# Patient Record
Sex: MALE | Race: WHITE | Employment: UNEMPLOYED | URBAN - NONMETROPOLITAN AREA
[De-identification: names, ages, dates, MRNs, and addresses within clinical notes are randomized per-mention and may not be internally consistent; named-entity substitution may affect disease eponyms.]

---

## 2017-03-20 ENCOUNTER — HISTORY (OUTPATIENT)
Dept: FAMILY MEDICINE | Facility: OTHER | Age: 22
End: 2017-03-20

## 2017-03-20 ENCOUNTER — OFFICE VISIT - GICH (OUTPATIENT)
Dept: FAMILY MEDICINE | Facility: OTHER | Age: 22
End: 2017-03-20

## 2017-03-20 DIAGNOSIS — J02.9 ACUTE PHARYNGITIS: ICD-10-CM

## 2017-03-20 LAB — STREP A ANTIGEN - HISTORICAL: NEGATIVE

## 2017-03-21 ENCOUNTER — OFFICE VISIT - GICH (OUTPATIENT)
Dept: FAMILY MEDICINE | Facility: OTHER | Age: 22
End: 2017-03-21

## 2017-03-21 ENCOUNTER — HISTORY (OUTPATIENT)
Dept: FAMILY MEDICINE | Facility: OTHER | Age: 22
End: 2017-03-21

## 2017-03-21 DIAGNOSIS — J11.1 INFLUENZA DUE TO UNIDENTIFIED INFLUENZA VIRUS WITH OTHER RESPIRATORY MANIFESTATIONS: ICD-10-CM

## 2017-03-23 ENCOUNTER — COMMUNICATION - GICH (OUTPATIENT)
Dept: FAMILY MEDICINE | Facility: OTHER | Age: 22
End: 2017-03-23

## 2017-03-23 DIAGNOSIS — L30.9 DERMATITIS: ICD-10-CM

## 2018-01-03 NOTE — NURSING NOTE
Patient Information     Patient Name MRN Kevin Bueno [id number] Male 1995      Nursing Note by Sharron Dominguez at 3/20/2017  2:45 PM     Author:  Sharron Dominguez Service:  (none) Author Type:  NURS- Student Practical Nurse     Filed:  3/20/2017  2:28 PM Encounter Date:  3/20/2017 Status:  Signed     :  Sharron Dominguez (NURS- Student Practical Nurse)            Patient presents with sore, scratchy throat,  Nausea and headache starting today. Sharron Dominguez LPN .............3/20/2017  2:20 PM

## 2018-01-04 NOTE — TELEPHONE ENCOUNTER
Patient Information     Patient Name MRN Kevin Bueno [id number] Male 1995      Telephone Encounter by Wilma Charlton RN at 3/23/2017  4:20 PM     Author:  Wilma Charlton RN Service:  (none) Author Type:  (none)     Filed:  3/23/2017  4:22 PM Encounter Date:  3/23/2017 Status:  Signed     :  Wilma Charlton RN (NURS- Registered Nurse)            Steroid Topical Creams    Office visit in the past 12 months.    Last visit with BA CARRILLO was on: 2016 in [facility]  Next visit with BA CARRILLO is on: No future appointment listed with this provider  Next visit with Family Practice is on: No future appointment listed in this department    Max refill for one year only for documented problems such as psoriasis and eczema.    Patient is due for medication management appointment. Limited refill provided at this time and letter sent for reminder to patient. Prescription refilled per RN Medication Refill Policy.................... Wilma Charlton ....................  3/23/2017   4:20 PM

## 2018-01-04 NOTE — PATIENT INSTRUCTIONS
Patient Information     Patient Name MRN Kevin Bueno [id number] Male 1995      Patient Instructions by Namrata Mercedes NP at 3/20/2017  2:45 PM     Author:  Namrata Mercedes NP Service:  (none) Author Type:  PHYS- Nurse Practitioner     Filed:  3/20/2017  2:49 PM Encounter Date:  3/20/2017 Status:  Signed     :  Namrata Mercedes NP (PHYS- Nurse Practitioner)            Negative rapid strep test    Symptoms likely due to virus. No antibiotic is needed at this time.       Most coughs are caused by a viral infection.   Usually coughs can last 2 to 3 weeks. Sometimes the cough becomes loose (wet) for a few days, and your child coughs up a lot of phlegm (mucus). This is usually a sign that the end of the illness is near.    Most sore throats are caused by viruses and are part of a cold. About 10% of sore throats are caused by strep bacteria.    Encouraged fluids and rest.    May use symptomatic care with tylenol or ibuprofen.     Using a humidifier works well to break up the congestion.     Elevate the mattress to 15 degrees in order to help with the congestion.    Frequent swallows of cool liquid.      Oatmeal or honey coats the throat and some patients find it soothes the pain.     Salt water gargles as needed    Return to clinic with change/worsening of symptoms or concerns.

## 2018-01-04 NOTE — PROGRESS NOTES
Patient Information     Patient Name MRN Sex Kevin Robles [id number] Male 1995      Progress Notes by Tonia Wallace NP at 3/21/2017  4:00 PM     Author:  Tonia Wallace NP Service:  (none) Author Type:  PHYS- Nurse Practitioner     Filed:  3/21/2017  6:32 PM Encounter Date:  3/21/2017 Status:  Signed     :  Tonia Wallace NP (PHYS- Nurse Practitioner)            HPI:    Kevin Koehler is a 22 y.o. male who presents to clinic today for fever. He was seen in  yesterday for sore throat, fever, cough. Has had sinus congestion. Tested for strep which was negative. Dx with viral illness. His sx yeterday. Reports having fevers again today up to 102. This was checked by [facility] nurse. He is concerned about the fever. Does smoke daily. No [facility] records. He reports no flu shot this year. He has taken ibuprofen for sx.     No past medical history on file.  No past surgical history on file.  Social History       Substance Use Topics         Smoking status:   Current Every Day Smoker     Packs/day:  1.00     Types:  Cigarettes     Smokeless tobacco:   Never Used     Alcohol use   No      Comment: occassional      Current Outpatient Prescriptions       Medication  Sig Dispense Refill     miconazole nitrate powder (LOTRIMIN AF) 2 % powder Apply  topically to affected area(s) 2 times daily. 1 Container 2     omeprazole (PRILOSEC) 20 mg Delayed-Release capsule Take 1 capsule by mouth once daily before a meal. 30 capsule 0     QUEtiapine (SEROQUEL) 100 mg tablet   1     triamcinolone (ARISTOCORT; KENALOG) 0.1 % cream Apply  topically to affected area(s) 3 times daily. 1 Tube 0     No current facility-administered medications for this visit.      Medications have been reviewed by me and are current to the best of my knowledge and ability.    Allergies     Allergen  Reactions     Abilify [Aripiprazole] Muscle Weakness     Codeine *Unknown - Childhood Rxn     Penicillins *Unknown - Childhood Rxn        ROS:  Pertinent positives and negatives are noted in HPI.    EXAM:  General appearance: ill but nontoxic appearing male, in no acute distress  Head: normocephalic, atraumatic  Ears: TM's with cone of light, no erythema, canals clear bilaterally  Eyes: conjunctivae normal  Orophayrnx: moist mucous membranes, tonsils with erythema, no exudates or petechiae, no post nasal drip seen  Neck: supple without adenopathy  Respiratory: clear to auscultation bilaterally, cough, no respiratory distress  Cardiac: RRR with no murmurs  Psychological: normal affect, alert and pleasant    ASSESSMENT/PLAN:    ICD-10-CM    1. Influenza J11.1 oseltamivir (TAMIFLU) 75 mg capsule   RST negative yesterday. Sx consistent with influenza. Tx with tamiflu due to onset of sx yesterday. Reviewed need to complete all antivirals. Discussed typical course of illness, symptomatic treatment and when to return to clinic. Patient in agreement with plan and all questions were answered.     Patient Instructions      Index Lithuanian All languages Related topics   Flu (Influenza)   ________________________________________________________________________  KEY POINTS    Flu is caused by a virus, and can be spread by coughing or sneezing, or by touching something with the virus on it.    The flu vaccine is the best way to help prevent the flu. Washing your hands often is also important. Flu can be treated rest, drinking plenty of liquids, and sometimes with medicine.    If you have another medical problem and get the flu, it s best to see your healthcare provider.  ________________________________________________________________________  What is flu?   Influenza, also called the flu, is an infection caused by a virus. The flu affects your whole body, especially your air passages, and causes symptoms that are similar to cold symptoms. Flu symptoms tend to be worse than cold symptoms, but it can sometimes be hard to tell the difference between the flu and a  cold unless you get a test.  Infection with the flu virus sometimes leads to other infections, such as ear, sinus, and chest infections. Pneumonia can also occur as a result of the flu. It can be caused by the flu virus itself or by bacteria infecting lung tissues that have been damaged by the virus. Older adults; people whose immune systems are weak; and people with chronic medical problems, such as heart or lung disease or diabetes, are at risk for more severe symptoms or problems. This is why it s important to try to prevent flu by getting flu shots every year.  What is the cause?  Flu is caused by a virus. When you have the flu, the virus is in your mucus and saliva and can spread to others when you cough or sneeze. People can also get the flu if they touch something with the flu virus on it (like cups, doorknobs, and hands) and then touch their mouth, nose, or eyes.  Outbreaks of flu occur every year, usually in late fall and winter.  What are the symptoms?  Flu tends to start suddenly. You may feel fine one hour and feel sick the next. Flu symptoms may be different from person to person. Some of the common symptoms include:    Chills, sweating, and fever    Cough    Runny or stuffy nose    Headache    Muscle or body aches    Sore throat    Tiredness  Flu symptoms usually last 3 to 7 days. You may start feeling better after the first 2 days or so.  How is it diagnosed?  Your healthcare provider will ask about your symptoms and may examine you. The diagnosis is usually based on your symptoms. There are lab tests for flu, but in most cases there is no need to do a test, especially when many others in your community are sick with the flu.  How is it treated?  Usually you can treat your symptoms at home.    Get plenty of rest.    Drink a lot of clear liquids. Water, broth, juice, electrolyte solutions, and noncaffeinated drinks are best. When you have a high fever, your body needs more liquid because you lose more  water in your breath and from your skin. Having enough fluids also helps the mucus in your sinuses and lungs stay thin and easy to clear from the body. When the mucus is thin, it is less likely to cause a sinus or chest infection.    Consider taking acetaminophen or ibuprofen to relieve headaches and muscle aches and to lower a fever. Read the label and take as directed. Unless recommended by your healthcare provider, you should not take these medicines for more than 10 days.    Nonsteroidal anti-inflammatory medicines (NSAIDs), such as ibuprofen, naproxen, and aspirin, may cause stomach bleeding and other problems. These risks increase with age.    Acetaminophen may cause liver damage or other problems. Unless recommended by your provider, don't take more than 3000 milligrams (mg) in 24 hours. To make sure you don t take too much, check other medicines you take to see if they also contain acetaminophen. Ask your provider if you need to avoid drinking alcohol while taking this medicine.    If your nose or sinuses get congested, a decongestant medicine may help you feel better. Taking a decongestant may help prevent ear or sinus infections.    Cough medicine or cough drops may temporarily help control a cough.  Antiviral medicine is medicine your healthcare provider can prescribe that may make flu symptoms less severe. It may also help you feel better a little sooner. The medicine can be taken as a tablet or nasal spray. It helps only if you start taking it within the first 2 days of illness. Usually it is taken for only a few days. Even if you are taking antiviral medicine, you can pass the flu virus to other people. It is still important to wash your hands often and cover your mouth and nose when you cough or sneeze.  Your healthcare provider may prescribe antiviral medicine if you aren t sick yet but have been exposed to the flu and have not had the flu vaccine.  Talk to your healthcare provider if you have  symptoms of the flu and:    You have heart disease, asthma, chronic bronchitis, kidney disease, diabetes, or another chronic medical problem.    Your immune system does not work normally (for example, because you are taking steroid medicine for a medical problem).    Your symptoms get more severe, you have a painful cough, you are coughing up mucus, or you are having trouble breathing. These symptoms can be signs of pneumonia.  Ask your healthcare provider:    How and when you will get your test results    How long it will take to recover from this illness    If there are activities you should avoid, and when you can return to your normal activities    How to take care of yourself at home    What symptoms or problems you should watch for and what to do if you have them  Make sure you know when you should come back for a checkup. Keep all appointments for provider visits or tests.  How can I help prevent flu?  The flu vaccine is the best way to help prevent the flu. If you do get the flu, the vaccine may help keep you from getting really sick. The flu vaccine is recommended for adults and children 6 months and older. It s especially important for those with a chronic illness.  The flu vaccine can be given as a nasal spray or as a shot in the arm. The nasal spray is not recommended for the 2684-5586 flu season because it has not prevented the disease for the last 3 years.    The shot contains killed virus and is safe for everyone age 6 months and older.  You should get a new flu shot every year because the vaccine wears off over time and because it is changed each year to protect against the current year s most likely flu strains. It s best to get the new vaccine as soon as it s available each year, before the start of flu season. However, if the vaccine is still available, it can be helpful to get it anytime during the flu season. Flu season usually starts in October and can last through May.  Flu seasons can vary  from region to region. If you are at high risk for infection and plan to travel to an area where you might be exposed to the flu, make sure you have an up-to-date flu shot before you go on your trip.  Other things you can do to help avoid getting the flu are:    Wash your hands often with soap and water. Wash for 20 seconds (long enough to sing the whole  Happy Birthday  song) or use an alcohol-based hand .    Avoid touching your eyes, nose, or mouth when you are out in public.    Stay at least 6 feet away from people who are sick, if you can.    Try to take good care of yourself: Get plenty of sleep, be physically active, manage your stress, drink plenty of fluids, and eat healthy food. Stop smoking.    Keep surfaces clean--especially bedside tables, surfaces in the bathroom, and toys for children. Some viruses and bacteria can live 2 hours or more on surfaces like cafeteria tables, doorknobs, and desks. Wipe them down with a household disinfectant according to directions on the label.  If you are sick, you can help protect others if you:    Don t go to work or school. Avoid contact with other people except to get medical care.    Cover your nose and mouth with a tissue when you cough or sneeze. Throw the tissue in the trash after you use it, and then wash your hands. If you don t have a tissue, cough or sneeze into your upper sleeve instead of your hands.    Clean your hands often with soap and water or an alcohol-based hand , especially after using tissues or coughing or sneezing into your hands.  Developed by [employer].  Adult Advisor 2016.3 published by [employer].  Last modified: 2016-06-29  Last reviewed: 2014-10-30  This content is reviewed periodically and is subject to change as new health information becomes available. The information is intended to inform and educate and is not a replacement for medical evaluation, advice, diagnosis or treatment by a healthcare professional.  References    Adult Advisor 2016.3 Index    Copyright   2016 [employer], a division of [employer] All rights reserved.

## 2018-01-04 NOTE — PATIENT INSTRUCTIONS
Patient Information     Patient Name MRKevin Blackman [id number] Male 1995      Patient Instructions by Tonia Wallace NP at 3/21/2017  5:10 PM     Author:  Tonia Wallace NP  Service:  (none) Author Type:  PHYS- Nurse Practitioner     Filed:  3/21/2017  5:11 PM  Encounter Date:  3/21/2017 Status:  Addendum     :  Tonia Wallace NP (PHYS- Nurse Practitioner)        Related Notes: Original Note by Tonia Wallace NP (PHYS- Nurse Practitioner) filed at 3/21/2017  5:10 PM               Index Sammarinese All languages Related topics   Flu (Influenza)   ________________________________________________________________________  KEY POINTS    Flu is caused by a virus, and can be spread by coughing or sneezing, or by touching something with the virus on it.    The flu vaccine is the best way to help prevent the flu. Washing your hands often is also important. Flu can be treated rest, drinking plenty of liquids, and sometimes with medicine.    If you have another medical problem and get the flu, it s best to see your healthcare provider.  ________________________________________________________________________  What is flu?   Influenza, also called the flu, is an infection caused by a virus. The flu affects your whole body, especially your air passages, and causes symptoms that are similar to cold symptoms. Flu symptoms tend to be worse than cold symptoms, but it can sometimes be hard to tell the difference between the flu and a cold unless you get a test.  Infection with the flu virus sometimes leads to other infections, such as ear, sinus, and chest infections. Pneumonia can also occur as a result of the flu. It can be caused by the flu virus itself or by bacteria infecting lung tissues that have been damaged by the virus. Older adults; people whose immune systems are weak; and people with chronic medical problems, such as heart or lung disease or diabetes, are at risk for more severe symptoms or  problems. This is why it s important to try to prevent flu by getting flu shots every year.  What is the cause?  Flu is caused by a virus. When you have the flu, the virus is in your mucus and saliva and can spread to others when you cough or sneeze. People can also get the flu if they touch something with the flu virus on it (like cups, doorknobs, and hands) and then touch their mouth, nose, or eyes.  Outbreaks of flu occur every year, usually in late fall and winter.  What are the symptoms?  Flu tends to start suddenly. You may feel fine one hour and feel sick the next. Flu symptoms may be different from person to person. Some of the common symptoms include:    Chills, sweating, and fever    Cough    Runny or stuffy nose    Headache    Muscle or body aches    Sore throat    Tiredness  Flu symptoms usually last 3 to 7 days. You may start feeling better after the first 2 days or so.  How is it diagnosed?  Your healthcare provider will ask about your symptoms and may examine you. The diagnosis is usually based on your symptoms. There are lab tests for flu, but in most cases there is no need to do a test, especially when many others in your community are sick with the flu.  How is it treated?  Usually you can treat your symptoms at home.    Get plenty of rest.    Drink a lot of clear liquids. Water, broth, juice, electrolyte solutions, and noncaffeinated drinks are best. When you have a high fever, your body needs more liquid because you lose more water in your breath and from your skin. Having enough fluids also helps the mucus in your sinuses and lungs stay thin and easy to clear from the body. When the mucus is thin, it is less likely to cause a sinus or chest infection.    Consider taking acetaminophen or ibuprofen to relieve headaches and muscle aches and to lower a fever. Read the label and take as directed. Unless recommended by your healthcare provider, you should not take these medicines for more than 10  days.    Nonsteroidal anti-inflammatory medicines (NSAIDs), such as ibuprofen, naproxen, and aspirin, may cause stomach bleeding and other problems. These risks increase with age.    Acetaminophen may cause liver damage or other problems. Unless recommended by your provider, don't take more than 3000 milligrams (mg) in 24 hours. To make sure you don t take too much, check other medicines you take to see if they also contain acetaminophen. Ask your provider if you need to avoid drinking alcohol while taking this medicine.    If your nose or sinuses get congested, a decongestant medicine may help you feel better. Taking a decongestant may help prevent ear or sinus infections.    Cough medicine or cough drops may temporarily help control a cough.  Antiviral medicine is medicine your healthcare provider can prescribe that may make flu symptoms less severe. It may also help you feel better a little sooner. The medicine can be taken as a tablet or nasal spray. It helps only if you start taking it within the first 2 days of illness. Usually it is taken for only a few days. Even if you are taking antiviral medicine, you can pass the flu virus to other people. It is still important to wash your hands often and cover your mouth and nose when you cough or sneeze.  Your healthcare provider may prescribe antiviral medicine if you aren t sick yet but have been exposed to the flu and have not had the flu vaccine.  Talk to your healthcare provider if you have symptoms of the flu and:    You have heart disease, asthma, chronic bronchitis, kidney disease, diabetes, or another chronic medical problem.    Your immune system does not work normally (for example, because you are taking steroid medicine for a medical problem).    Your symptoms get more severe, you have a painful cough, you are coughing up mucus, or you are having trouble breathing. These symptoms can be signs of pneumonia.  Ask your healthcare provider:    How and when you  will get your test results    How long it will take to recover from this illness    If there are activities you should avoid, and when you can return to your normal activities    How to take care of yourself at home    What symptoms or problems you should watch for and what to do if you have them  Make sure you know when you should come back for a checkup. Keep all appointments for provider visits or tests.  How can I help prevent flu?  The flu vaccine is the best way to help prevent the flu. If you do get the flu, the vaccine may help keep you from getting really sick. The flu vaccine is recommended for adults and children 6 months and older. It s especially important for those with a chronic illness.  The flu vaccine can be given as a nasal spray or as a shot in the arm. The nasal spray is not recommended for the 8882-4759 flu season because it has not prevented the disease for the last 3 years.    The shot contains killed virus and is safe for everyone age 6 months and older.  You should get a new flu shot every year because the vaccine wears off over time and because it is changed each year to protect against the current year s most likely flu strains. It s best to get the new vaccine as soon as it s available each year, before the start of flu season. However, if the vaccine is still available, it can be helpful to get it anytime during the flu season. Flu season usually starts in October and can last through May.  Flu seasons can vary from region to region. If you are at high risk for infection and plan to travel to an area where you might be exposed to the flu, make sure you have an up-to-date flu shot before you go on your trip.  Other things you can do to help avoid getting the flu are:    Wash your hands often with soap and water. Wash for 20 seconds (long enough to sing the whole  Happy Birthday  song) or use an alcohol-based hand .    Avoid touching your eyes, nose, or mouth when you are out in  public.    Stay at least 6 feet away from people who are sick, if you can.    Try to take good care of yourself: Get plenty of sleep, be physically active, manage your stress, drink plenty of fluids, and eat healthy food. Stop smoking.    Keep surfaces clean--especially bedside tables, surfaces in the bathroom, and toys for children. Some viruses and bacteria can live 2 hours or more on surfaces like cafeteria tables, doorknobs, and desks. Wipe them down with a household disinfectant according to directions on the label.  If you are sick, you can help protect others if you:    Don t go to work or school. Avoid contact with other people except to get medical care.    Cover your nose and mouth with a tissue when you cough or sneeze. Throw the tissue in the trash after you use it, and then wash your hands. If you don t have a tissue, cough or sneeze into your upper sleeve instead of your hands.    Clean your hands often with soap and water or an alcohol-based hand , especially after using tissues or coughing or sneezing into your hands.  Developed by [employer].  Adult Advisor 2016.3 published by [employer].  Last modified: 2016-06-29  Last reviewed: 2014-10-30  This content is reviewed periodically and is subject to change as new health information becomes available. The information is intended to inform and educate and is not a replacement for medical evaluation, advice, diagnosis or treatment by a healthcare professional.  References   Adult Advisor 2016.3 Index    Copyright   2016 [employer], a division of [employer] All rights reserved.

## 2018-01-04 NOTE — PROGRESS NOTES
Patient Information     Patient Name MRN Sex Kevin Robles [id number] Male 1995      Progress Notes by Namrata Mercedes NP at 3/20/2017  2:45 PM     Author:  Namrata Mercedes NP Service:  (none) Author Type:  PHYS- Nurse Practitioner     Filed:  3/20/2017  3:30 PM Encounter Date:  3/20/2017 Status:  Signed     :  Namrata Mercedes NP (PHYS- Nurse Practitioner)            HPI:    Kevin Koehler is a 22 y.o. male who presents to clinic today for strep testing.  Symptoms started today.  Symptoms include sore and scratchy throat, low grade fever, chills, decreased energy, nausea, and headache.  Appetite decreased, no solids, drinking fluids.  No runny or stuffy nose.  Dry cough.  Daily smoker.  No tylenol or ibuprofen.            No past medical history on file.  No past surgical history on file.  Social History       Substance Use Topics         Smoking status:   Current Every Day Smoker     Packs/day:  1.00     Types:  Cigarettes     Smokeless tobacco:   Never Used     Alcohol use   No      Comment: occassional      Current Outpatient Prescriptions       Medication  Sig Dispense Refill     miconazole nitrate powder (LOTRIMIN AF) 2 % powder Apply  topically to affected area(s) 2 times daily. 1 Container 2     omeprazole (PRILOSEC) 20 mg Delayed-Release capsule Take 1 capsule by mouth once daily before a meal. 30 capsule 0     QUEtiapine (SEROQUEL) 100 mg tablet   1     triamcinolone (ARISTOCORT; KENALOG) 0.1 % cream Apply  topically to affected area(s) 3 times daily. 1 Tube 0     No current facility-administered medications for this visit.      Medications have been reviewed by me and are current to the best of my knowledge and ability.    Allergies     Allergen  Reactions     Abilify [Aripiprazole] Muscle Weakness     Codeine *Unknown - Childhood Rxn     Penicillins *Unknown - Childhood Rxn       ROS:  Refer to HPI    Visit Vitals       /70     Pulse 96     Temp 99.8  F (37.7  C)  "(Tympanic)     Ht 1.715 m (5' 7.52\")     Wt 55.2 kg (121 lb 9.6 oz)     BMI 18.75 kg/m2       EXAM:  General Appearance: Well appearing male adolescent,  appropriate appearance for age. No acute distress  Head: normocephalic, atraumatic  Ears: Left TM with bony landmarks appreciated, no erythema, no effusion, no bulging, no purulence.  Right TM with bony landmarks appreciated, no erythema, no effusion, no bulging, no purulence.   Left auditory canal clear.  Right auditory canal clear.  Normal external ears, non tender.  Eyes: conjunctivae normal, no drainage  Orophayrnx: moist mucous membranes, posterior pharynx with mild erythema, tonsils without hypertrophy, no erythema, no exudates or petechiae, no post nasal drip seen.    Neck: supple without adenopathy  Respiratory: normal chest wall and respirations.  Normal effort.  Clear to auscultation bilaterally, no wheezes or rhonchi or congestion, no cough appreciated   Cardiac: RRR with no murmurs  Psychological: normal affect, alert and pleasant      Labs:  Results for orders placed or performed in visit on 03/20/17      THROAT RAPID STREP A WITH REFLEX      Result  Value Ref Range    STREP A ANTIGEN           Negative Negative           ASSESSMENT/PLAN:    ICD-10-CM    1. Sore throat J02.9 THROAT RAPID STREP A WITH REFLEX      THROAT RAPID STREP A WITH REFLEX         Negative rapid strep test  Likely viral illness.  No antibiotics indicated.  Encouraged fluids  Symptomatic treatment - salt water gargles, honey, elevation, humidifier, sinus rinse/netti pot, lozenges, etc   Tylenol or ibuprofen PRN  Follow up if symptoms persist or worsen or concerns        Patient Instructions   Negative rapid strep test    Symptoms likely due to virus. No antibiotic is needed at this time.       Most coughs are caused by a viral infection.   Usually coughs can last 2 to 3 weeks. Sometimes the cough becomes loose (wet) for a few days, and your child coughs up a lot of phlegm (mucus). " This is usually a sign that the end of the illness is near.    Most sore throats are caused by viruses and are part of a cold. About 10% of sore throats are caused by strep bacteria.    Encouraged fluids and rest.    May use symptomatic care with tylenol or ibuprofen.     Using a humidifier works well to break up the congestion.     Elevate the mattress to 15 degrees in order to help with the congestion.    Frequent swallows of cool liquid.      Oatmeal or honey coats the throat and some patients find it soothes the pain.     Salt water gargles as needed    Return to clinic with change/worsening of symptoms or concerns.

## 2018-01-04 NOTE — NURSING NOTE
Patient Information     Patient Name MRN Kevin Bueno [id number] Male 1995      Nursing Note by Sharron Dominguez at 3/21/2017  4:00 PM     Author:  Sharron Dominguez Service:  (none) Author Type:  NURS- Student Practical Nurse     Filed:  3/21/2017  5:05 PM Encounter Date:  3/21/2017 Status:  Signed     :  Sharron Dominguez (NURS- Student Practical Nurse)            Patient presents with migraine, congestion, stomach ache, hasn't eaten for 2 day, not hydrating well, lethargy, sleeping all the time, fever since yesterday. Patient states since he was seen yesterday he was sleeping the whole time without eating or drinking. Sharron Dominguez LPN .............3/21/2017  5:00 PM

## 2018-01-27 VITALS
WEIGHT: 121.6 LBS | SYSTOLIC BLOOD PRESSURE: 110 MMHG | DIASTOLIC BLOOD PRESSURE: 70 MMHG | BODY MASS INDEX: 18.43 KG/M2 | TEMPERATURE: 99.8 F | HEART RATE: 96 BPM | HEIGHT: 68 IN

## 2018-01-27 VITALS
HEART RATE: 113 BPM | SYSTOLIC BLOOD PRESSURE: 108 MMHG | WEIGHT: 120.2 LBS | DIASTOLIC BLOOD PRESSURE: 68 MMHG | TEMPERATURE: 103.3 F | BODY MASS INDEX: 18.54 KG/M2

## 2018-03-01 ENCOUNTER — DOCUMENTATION ONLY (OUTPATIENT)
Dept: FAMILY MEDICINE | Facility: OTHER | Age: 23
End: 2018-03-01

## 2018-03-01 RX ORDER — TRIAMCINOLONE ACETONIDE 1 MG/G
CREAM TOPICAL
COMMUNITY
Start: 2017-03-23

## 2018-03-01 RX ORDER — QUETIAPINE FUMARATE 100 MG/1
TABLET, FILM COATED ORAL
COMMUNITY
Start: 2017-02-15 | End: 2018-09-15

## 2018-06-01 ENCOUNTER — HOSPITAL ENCOUNTER (EMERGENCY)
Facility: OTHER | Age: 23
Discharge: HOME | End: 2018-06-01
Attending: PHYSICIAN ASSISTANT | Admitting: PHYSICIAN ASSISTANT
Payer: COMMERCIAL

## 2018-06-01 VITALS
TEMPERATURE: 100 F | HEIGHT: 65 IN | RESPIRATION RATE: 16 BRPM | BODY MASS INDEX: 19.99 KG/M2 | OXYGEN SATURATION: 95 % | SYSTOLIC BLOOD PRESSURE: 113 MMHG | DIASTOLIC BLOOD PRESSURE: 71 MMHG | HEART RATE: 94 BPM | WEIGHT: 120 LBS

## 2018-06-01 DIAGNOSIS — R51.9 ACUTE NONINTRACTABLE HEADACHE, UNSPECIFIED HEADACHE TYPE: ICD-10-CM

## 2018-06-01 DIAGNOSIS — E86.0 DEHYDRATION: ICD-10-CM

## 2018-06-01 DIAGNOSIS — E83.42 HYPOMAGNESEMIA: ICD-10-CM

## 2018-06-01 DIAGNOSIS — R50.9 FEVER, UNSPECIFIED FEVER CAUSE: ICD-10-CM

## 2018-06-01 DIAGNOSIS — R07.0 THROAT PAIN: ICD-10-CM

## 2018-06-01 LAB
ALBUMIN SERPL-MCNC: 4.2 G/DL (ref 3.5–5.7)
ALBUMIN UR-MCNC: NEGATIVE MG/DL
ALP SERPL-CCNC: 48 U/L (ref 34–104)
ALT SERPL W P-5'-P-CCNC: 9 U/L (ref 7–52)
ANION GAP SERPL CALCULATED.3IONS-SCNC: 8 MMOL/L (ref 3–14)
APPEARANCE UR: CLEAR
AST SERPL W P-5'-P-CCNC: 11 U/L (ref 13–39)
BASOPHILS # BLD AUTO: 0.1 10E9/L (ref 0–0.2)
BASOPHILS NFR BLD AUTO: 0.4 %
BILIRUB SERPL-MCNC: 0.4 MG/DL (ref 0.3–1)
BILIRUB UR QL STRIP: NEGATIVE
BUN SERPL-MCNC: 11 MG/DL (ref 7–25)
CALCIUM SERPL-MCNC: 9.3 MG/DL (ref 8.6–10.3)
CHLORIDE SERPL-SCNC: 102 MMOL/L (ref 98–107)
CO2 SERPL-SCNC: 28 MMOL/L (ref 21–31)
COLOR UR AUTO: YELLOW
CREAT SERPL-MCNC: 1.05 MG/DL (ref 0.7–1.3)
DEPRECATED S PYO AG THROAT QL EIA: NORMAL
DIFFERENTIAL METHOD BLD: ABNORMAL
EOSINOPHIL # BLD AUTO: 0 10E9/L (ref 0–0.7)
EOSINOPHIL NFR BLD AUTO: 0.1 %
ERYTHROCYTE [DISTWIDTH] IN BLOOD BY AUTOMATED COUNT: 11.7 % (ref 10–15)
GFR SERPL CREATININE-BSD FRML MDRD: 88 ML/MIN/1.7M2
GLUCOSE SERPL-MCNC: 164 MG/DL (ref 70–105)
GLUCOSE UR STRIP-MCNC: NEGATIVE MG/DL
HCT VFR BLD AUTO: 42.3 % (ref 40–53)
HGB BLD-MCNC: 14.9 G/DL (ref 13.3–17.7)
HGB UR QL STRIP: NEGATIVE
IMM GRANULOCYTES # BLD: 0.1 10E9/L (ref 0–0.4)
IMM GRANULOCYTES NFR BLD: 0.4 %
KETONES UR STRIP-MCNC: NEGATIVE MG/DL
LEUKOCYTE ESTERASE UR QL STRIP: NEGATIVE
LYMPHOCYTES # BLD AUTO: 1.4 10E9/L (ref 0.8–5.3)
LYMPHOCYTES NFR BLD AUTO: 9.9 %
MAGNESIUM SERPL-MCNC: 1.7 MG/DL (ref 1.9–2.7)
MCH RBC QN AUTO: 31.4 PG (ref 26.5–33)
MCHC RBC AUTO-ENTMCNC: 35.2 G/DL (ref 31.5–36.5)
MCV RBC AUTO: 89 FL (ref 78–100)
MONOCYTES # BLD AUTO: 1.2 10E9/L (ref 0–1.3)
MONOCYTES NFR BLD AUTO: 8.4 %
NEUTROPHILS # BLD AUTO: 11.3 10E9/L (ref 1.6–8.3)
NEUTROPHILS NFR BLD AUTO: 80.8 %
NITRATE UR QL: NEGATIVE
PH UR STRIP: 6 PH (ref 5–7)
PLATELET # BLD AUTO: 171 10E9/L (ref 150–450)
POTASSIUM SERPL-SCNC: 3.7 MMOL/L (ref 3.5–5.1)
PROT SERPL-MCNC: 6.5 G/DL (ref 6.4–8.9)
RBC # BLD AUTO: 4.75 10E12/L (ref 4.4–5.9)
SODIUM SERPL-SCNC: 138 MMOL/L (ref 134–144)
SOURCE: NORMAL
SP GR UR STRIP: 1.02 (ref 1–1.03)
SPECIMEN SOURCE: NORMAL
UROBILINOGEN UR STRIP-ACNC: 0.2 EU/DL (ref 0.2–1)
WBC # BLD AUTO: 14 10E9/L (ref 4–11)

## 2018-06-01 PROCEDURE — 80053 COMPREHEN METABOLIC PANEL: CPT | Performed by: PHYSICIAN ASSISTANT

## 2018-06-01 PROCEDURE — 36415 COLL VENOUS BLD VENIPUNCTURE: CPT | Performed by: PHYSICIAN ASSISTANT

## 2018-06-01 PROCEDURE — 96374 THER/PROPH/DIAG INJ IV PUSH: CPT | Performed by: PHYSICIAN ASSISTANT

## 2018-06-01 PROCEDURE — 96361 HYDRATE IV INFUSION ADD-ON: CPT | Performed by: PHYSICIAN ASSISTANT

## 2018-06-01 PROCEDURE — 25000132 ZZH RX MED GY IP 250 OP 250 PS 637: Performed by: PHYSICIAN ASSISTANT

## 2018-06-01 PROCEDURE — 87880 STREP A ASSAY W/OPTIC: CPT | Performed by: PHYSICIAN ASSISTANT

## 2018-06-01 PROCEDURE — 83735 ASSAY OF MAGNESIUM: CPT | Performed by: PHYSICIAN ASSISTANT

## 2018-06-01 PROCEDURE — 96375 TX/PRO/DX INJ NEW DRUG ADDON: CPT | Performed by: PHYSICIAN ASSISTANT

## 2018-06-01 PROCEDURE — 99284 EMERGENCY DEPT VISIT MOD MDM: CPT | Mod: 25 | Performed by: PHYSICIAN ASSISTANT

## 2018-06-01 PROCEDURE — 85025 COMPLETE CBC W/AUTO DIFF WBC: CPT | Performed by: PHYSICIAN ASSISTANT

## 2018-06-01 PROCEDURE — 81003 URINALYSIS AUTO W/O SCOPE: CPT | Performed by: PHYSICIAN ASSISTANT

## 2018-06-01 PROCEDURE — 25000128 H RX IP 250 OP 636: Performed by: PHYSICIAN ASSISTANT

## 2018-06-01 PROCEDURE — 99283 EMERGENCY DEPT VISIT LOW MDM: CPT | Mod: Z6 | Performed by: PHYSICIAN ASSISTANT

## 2018-06-01 RX ORDER — DIPHENHYDRAMINE HYDROCHLORIDE 50 MG/ML
25 INJECTION INTRAMUSCULAR; INTRAVENOUS ONCE
Status: COMPLETED | OUTPATIENT
Start: 2018-06-01 | End: 2018-06-01

## 2018-06-01 RX ORDER — CLINDAMYCIN HCL 150 MG
300 CAPSULE ORAL ONCE
Status: COMPLETED | OUTPATIENT
Start: 2018-06-01 | End: 2018-06-01

## 2018-06-01 RX ORDER — SODIUM CHLORIDE 9 MG/ML
1000 INJECTION, SOLUTION INTRAVENOUS CONTINUOUS
Status: DISCONTINUED | OUTPATIENT
Start: 2018-06-01 | End: 2018-06-02 | Stop reason: HOSPADM

## 2018-06-01 RX ORDER — KETOROLAC TROMETHAMINE 30 MG/ML
30 INJECTION, SOLUTION INTRAMUSCULAR; INTRAVENOUS ONCE
Status: COMPLETED | OUTPATIENT
Start: 2018-06-01 | End: 2018-06-01

## 2018-06-01 RX ORDER — CLINDAMYCIN HCL 300 MG
300 CAPSULE ORAL 4 TIMES DAILY
Qty: 40 CAPSULE | Refills: 0 | Status: SHIPPED | OUTPATIENT
Start: 2018-06-01 | End: 2018-06-11

## 2018-06-01 RX ORDER — DEXAMETHASONE SODIUM PHOSPHATE 10 MG/ML
10 INJECTION, SOLUTION INTRAMUSCULAR; INTRAVENOUS ONCE
Status: COMPLETED | OUTPATIENT
Start: 2018-06-01 | End: 2018-06-01

## 2018-06-01 RX ORDER — MAGNESIUM OXIDE 400 MG/1
800 TABLET ORAL ONCE
Status: COMPLETED | OUTPATIENT
Start: 2018-06-01 | End: 2018-06-01

## 2018-06-01 RX ADMIN — CLINDAMYCIN HYDROCHLORIDE 300 MG: 150 CAPSULE ORAL at 22:47

## 2018-06-01 RX ADMIN — SODIUM CHLORIDE 1000 ML: 900 INJECTION, SOLUTION INTRAVENOUS at 21:44

## 2018-06-01 RX ADMIN — MAGNESIUM OXIDE TAB 400 MG (241.3 MG ELEMENTAL MG) 800 MG: 400 (241.3 MG) TAB at 21:59

## 2018-06-01 RX ADMIN — DIPHENHYDRAMINE HYDROCHLORIDE 25 MG: 50 INJECTION, SOLUTION INTRAMUSCULAR; INTRAVENOUS at 21:45

## 2018-06-01 RX ADMIN — DEXAMETHASONE SODIUM PHOSPHATE 10 MG: 10 INJECTION, SOLUTION INTRAMUSCULAR; INTRAVENOUS at 21:46

## 2018-06-01 RX ADMIN — SODIUM CHLORIDE 1000 ML: 900 INJECTION, SOLUTION INTRAVENOUS at 22:45

## 2018-06-01 RX ADMIN — KETOROLAC TROMETHAMINE 30 MG: 30 INJECTION, SOLUTION INTRAMUSCULAR; INTRAVENOUS at 21:45

## 2018-06-01 NOTE — ED AVS SNAPSHOT
[facility]    [address]    [place] [address]    Phone:  [phone]    Fax:  [phone]                                       Kevin Koehler   MRN: [id number]    Department:  [facility]   Date of Visit:  6/1/2018           Patient Information     Date Of Birth          1995        Your diagnoses for this visit were:     Throat pain     Fever, unspecified fever cause     Acute nonintractable headache, unspecified headache type     Dehydration     Hypomagnesemia        You were seen by Salo Aponte PA-C.      Follow-up Information     Follow up with [facility].    Specialty:  EMERGENCY MEDICINE    Why:  As needed, If symptoms worsen    Contact information:    [address]  [place] [address] [phone]      Discharge References/Attachments     HEADACHE, UNSPECIFIED (ENGLISH)    DEHYDRATION (ADULT) (ENGLISH)    HYPOMAGNESEMIA, DISCHARGE INSTRUCTIONS (ENGLISH)    SORE THROAT, WHEN YOU HAVE A (ENGLISH)      24 Hour Appointment Hotline       To make an appointment at any [facility], call [phone] ([phone]). If you don't have a family doctor or clinic, we will help you find one. [facility] clinics are conveniently located to serve the needs of you and your family.             Review of your medicines      START taking        Dose / Directions Last dose taken    clindamycin 300 MG capsule   Commonly known as:  CLEOCIN   Dose:  300 mg   Quantity:  40 capsule        Take 1 capsule (300 mg) by mouth 4 times daily for 10 days   Refills:  0        magnesium oxide 400 (241.3 Mg) MG tablet   Commonly known as:  MAG-OX   Dose:  400 mg   Quantity:  10 tablet        Take 1 tablet (400 mg) by mouth 2 times daily   Refills:  3          Our records show that you are taking the medicines listed below. If these are incorrect, please call your family doctor or clinic.        Dose / Directions Last  dose taken    miconazole 2 % powder   Commonly known as:  MICATIN; MICRO GUARD        Refills:  0        omeprazole 20 MG CR capsule   Commonly known as:  priLOSEC   Dose:  20 mg        Take 20 mg by mouth daily with food   Refills:  0        QUEtiapine 100 MG tablet   Commonly known as:  SEROquel        Refills:  0        triamcinolone 0.1 % cream   Commonly known as:  KENALOG        Refills:  0                Prescriptions were sent or printed at these locations (2 Prescriptions)                   [id number] - [place], [place] - [address]   [address], [facility] [address]    Telephone:  [phone]   Fax:  [phone]   Hours:                  Printed at Department/Unit printer (2 of 2)         clindamycin (CLEOCIN) 300 MG capsule               magnesium oxide (MAG-OX) 400 (241.3 Mg) MG tablet                Procedures and tests performed during your visit     *UA reflex to Microscopic    CBC with platelets differential    Comprehensive metabolic panel    Magnesium    Rapid strep screen      Orders Needing Specimen Collection     None      Pending Results     No orders found from 5/30/2018 to 6/2/2018.            Pending Culture Results     No orders found from 5/30/2018 to 6/2/2018.            Pending Results Instructions     If you had any lab results that were not finalized at the time of your Discharge, you can call the ED Lab Result RN at [phone]. You will be contacted by this team for any positive Lab results or changes in treatment. The nurses are available 7 days a week from 10A to 6:30P.  You can leave a message 24 hours per day and they will return your call.        Thank you for choosing [facility]       Thank you for choosing [facility] for your care. Our goal is always to provide you with excellent care. Hearing back from our patients is one way we can continue to improve our services. Please take a few minutes to complete the written survey  "that you may receive in the mail after you visit with us. Thank you!        [employer] Information     [employer] lets you send messages to your doctor, view your test results, renew your prescriptions, schedule appointments and more. To sign up, go to [email or web address] . Click on \"Log in\" on the left side of the screen, which will take you to the Welcome page. Then click on \"Sign up Now\" on the right side of the page.     You will be asked to enter the access code listed below, as well as some personal information. Please follow the directions to create your username and password.     Your access code is: [email or web address]  Expires: 2018 11:05 PM     Your access code will  in 90 days. If you need help or a new code, please call your [place] clinic or [phone].        Care EveryWhere ID     This is your Care EveryWhere ID. This could be used by other organizations to access your [place] medical records  [id number]        Equal Access to Services     ROSALIO PRIDE : Hadpamela tano Sophoebe, waaxda luqadaha, qaybta kaalmada césar, martínez aguilar. So [facility] [phone].    ATENCIÓN: Si habla español, tiene a chacon disposición servicios gratuitos de asistencia lingüística. Llame al [phone].    We comply with applicable federal civil rights laws and [place] laws. We do not discriminate on the basis of race, color, national origin, age, disability, sex, sexual orientation, or gender identity.            After Visit Summary       This is your record. Keep this with you and show to your community pharmacist(s) and doctor(s) at your next visit.                  "

## 2018-06-01 NOTE — ED AVS SNAPSHOT
[facility]    [address]    [place] [address]    Phone:  [phone]    Fax:  [phone]                                       Kevin Koehler   MRN: [id number]    Department:  [facility]   Date of Visit:  6/1/2018           After Visit Summary Signature Page     I have received my discharge instructions, and my questions have been answered. I have discussed any challenges I see with this plan with the nurse or doctor.    ..........................................................................................................................................  Patient/Patient Representative Signature      ..........................................................................................................................................  Patient Representative Print Name and Relationship to Patient    ..................................................               ................................................  Date                                            Time    ..........................................................................................................................................  Reviewed by Signature/Title    ...................................................              ..............................................  Date                                                            Time

## 2018-06-02 ASSESSMENT — ENCOUNTER SYMPTOMS
EYE REDNESS: 0
VOICE CHANGE: 0
ARTHRALGIAS: 0
CONFUSION: 0
COLOR CHANGE: 0
ABDOMINAL PAIN: 0
HEADACHES: 1
DIFFICULTY URINATING: 0
FEVER: 1
MYALGIAS: 1
BACK PAIN: 1
SHORTNESS OF BREATH: 0
TROUBLE SWALLOWING: 1
NECK STIFFNESS: 0
SORE THROAT: 1
CHILLS: 1

## 2018-06-02 NOTE — ED NOTES
COLUMBIA-SUICIDE SEVERITY RATING SCALE   Screen with Triage Points for Emergency Department      Ask questions that are bolded and underlined.   Past  month   Ask Questions 1 and 2 YES NO   1)  Have you wished you were dead or wished you could go to sleep and not wake up?   x   2)  Have you actually had any thoughts of killing yourself?   x   If YES to 2, ask questions 3, 4, 5, and 6.  If NO to 2, go directly to question 6.   3)  Have you been thinking about how you might do this?   E.g.  I thought about taking an overdose but I never made a specific plan as to when where or how I would actually do it .and I would never go through with it.       4)  Have you had these thoughts and had some intention of acting on them?   As opposed to  I have the thoughts but I definitely will not do anything about them.       5)  Have you started to work out or worked out the details of how to kill yourself? Do you intend to carry out this plan?      6)  Have you ever done anything, started to do anything, or prepared to do anything to end your life?  Examples: Collected pills, obtained a gun, gave away valuables, wrote a will or suicide note, took out pills but didn t swallow any, held a gun but changed your mind or it was grabbed from your hand, went to the roof but didn t jump; or actually took pills, tried to shoot yourself, cut yourself, tried to hang yourself, etc.    If YES, ask: Was this within the past three months?  Lifetime    x     Past 3 Months     x   Item 1:  Behavioral Health Referral at Discharge  Item 2:  Behavioral Health Referral at Discharge   Item 3:  Behavioral Health Consult (Psychiatric Nurse/) and consider Patient Safety Precautions  Item 4:  Immediate Notification of Physician and/or Behavioral Health and Patient Safety Precautions   Item 5:  Immediate Notification of Physician and/or Behavioral Health and Patient Safety Precautions  Item 6:  Over 3 months ago: Behavioral Health Consult  (Psychiatric Nurse/) and consider Patient Safety Precautions  OR  Item 6:  3 months ago or less: Immediate Notification of Physician and/or Behavioral Health and Patient Safety Precautions

## 2018-06-02 NOTE — ED TRIAGE NOTES
"Pt admit to [facility], ambulatory.  Pt reports he's had a \"major migraine\" for 3 days, he can't clear his throat because it hurts, chills.  Pt says his back hurts too.    "

## 2018-06-02 NOTE — ED PROVIDER NOTES
History     Chief Complaint   Patient presents with     Headache     Chills     HPI Comments: This is a 89 yo male who has had a severe headache over the past 3 days.  He denies any Hx of migraines.  He has had a sore throat as well.  He has had fever and chills as well as body aches.  He feels fatigued and weak.  Denies any constipation or diarrhea.  No cough or SOB.     The history is provided by the patient.         Problem List:    There are no active problems to display for this patient.       Past Medical History:    History reviewed. No pertinent past medical history.    Past Surgical History:    History reviewed. No pertinent surgical history.    Family History:    No family history on file.    Social History:  Marital Status:  Single [1]  Social History   Substance Use Topics     Smoking status: Current Every Day Smoker     Packs/day: 1.00     Types: Cigarettes     Smokeless tobacco: Never Used      Comment: makes own cigarettes     Alcohol use No      Comment: Alcoholic Drinks/day: occassional        Medications:      clindamycin (CLEOCIN) 300 MG capsule   magnesium oxide (MAG-OX) 400 (241.3 Mg) MG tablet   miconazole (MICATIN; MICRO GUARD) 2 % powder   omeprazole (PRILOSEC) 20 MG CR capsule   QUEtiapine (SEROQUEL) 100 MG tablet   triamcinolone (KENALOG) 0.1 % cream         Review of Systems   Constitutional: Positive for chills and fever.   HENT: Positive for sore throat and trouble swallowing. Negative for congestion and voice change.    Eyes: Negative for redness.   Respiratory: Negative for shortness of breath.    Cardiovascular: Negative for chest pain.   Gastrointestinal: Negative for abdominal pain.   Genitourinary: Negative for difficulty urinating.   Musculoskeletal: Positive for back pain and myalgias. Negative for arthralgias and neck stiffness.   Skin: Negative for color change.   Neurological: Positive for headaches.   Psychiatric/Behavioral: Negative for confusion.       Physical Exam  "  BP: 122/79  Pulse: 112  Temp: 100.4  F (38  C)  Resp: 18  Height: 165.1 cm (5' 5\")  Weight: 54.4 kg (120 lb)  SpO2: 96 %      Physical Exam   Constitutional: He is oriented to person, place, and time. No distress.   HENT:   Head: Atraumatic.   Right Ear: No drainage. Tympanic membrane is not injected, not perforated, not erythematous and not bulging.   Left Ear: No drainage. Tympanic membrane is not injected, not perforated, not erythematous and not bulging.   Nose: No mucosal edema or rhinorrhea.   Mouth/Throat: Posterior oropharyngeal erythema present. No oropharyngeal exudate, posterior oropharyngeal edema or tonsillar abscesses.   Eyes: Pupils are equal, round, and reactive to light. No scleral icterus. Right eye exhibits normal extraocular motion and no nystagmus. Left eye exhibits normal extraocular motion and no nystagmus. Right pupil is round and reactive. Left pupil is round and reactive. Pupils are equal.   Cardiovascular: Normal heart sounds and intact distal pulses.    Pulmonary/Chest: Breath sounds normal. No respiratory distress.   Abdominal: Soft. Bowel sounds are normal. There is no tenderness.   Musculoskeletal: He exhibits no edema or tenderness.   Neurological: He is alert and oriented to person, place, and time. He displays no tremor. He displays no seizure activity. Coordination and gait normal. GCS eye subscore is 4. GCS verbal subscore is 5. GCS motor subscore is 6.   Reflex Scores:       Tricep reflexes are 2+ on the right side and 2+ on the left side.       Bicep reflexes are 2+ on the right side and 2+ on the left side.       Brachioradialis reflexes are 2+ on the right side and 2+ on the left side.       Patellar reflexes are 2+ on the right side and 2+ on the left side.       Achilles reflexes are 2+ on the right side and 2+ on the left side.  No focal neurological deficits   Skin: Skin is warm. No rash noted. He is not diaphoretic.       ED Course     ED Course     Procedures         "     Results for orders placed or performed during the hospital encounter of 06/01/18 (from the past 24 hour(s))   CBC with platelets differential   Result Value Ref Range    WBC 14.0 (H) 4.0 - 11.0 10e9/L    RBC Count 4.75 4.4 - 5.9 10e12/L    Hemoglobin 14.9 13.3 - 17.7 g/dL    Hematocrit 42.3 40.0 - 53.0 %    MCV 89 78 - 100 fl    MCH 31.4 26.5 - 33.0 pg    MCHC 35.2 31.5 - 36.5 g/dL    RDW 11.7 10.0 - 15.0 %    Platelet Count 171 150 - 450 10e9/L    Diff Method Automated Method     % Neutrophils 80.8 %    % Lymphocytes 9.9 %    % Monocytes 8.4 %    % Eosinophils 0.1 %    % Basophils 0.4 %    % Immature Granulocytes 0.4 %    Absolute Neutrophil 11.3 (H) 1.6 - 8.3 10e9/L    Absolute Lymphocytes 1.4 0.8 - 5.3 10e9/L    Absolute Monocytes 1.2 0.0 - 1.3 10e9/L    Absolute Eosinophils 0.0 0.0 - 0.7 10e9/L    Absolute Basophils 0.1 0.0 - 0.2 10e9/L    Abs Immature Granulocytes 0.1 0 - 0.4 10e9/L   Comprehensive metabolic panel   Result Value Ref Range    Sodium 138 134 - 144 mmol/L    Potassium 3.7 3.5 - 5.1 mmol/L    Chloride 102 98 - 107 mmol/L    Carbon Dioxide 28 21 - 31 mmol/L    Anion Gap 8 3 - 14 mmol/L    Glucose 164 (H) 70 - 105 mg/dL    Urea Nitrogen 11 7 - 25 mg/dL    Creatinine 1.05 0.70 - 1.30 mg/dL    GFR Estimate 88 >60 mL/min/1.7m2    GFR Estimate If Black >90 >60 mL/min/1.7m2    Calcium 9.3 8.6 - 10.3 mg/dL    Bilirubin Total 0.4 0.3 - 1.0 mg/dL    Albumin 4.2 3.5 - 5.7 g/dL    Protein Total 6.5 6.4 - 8.9 g/dL    Alkaline Phosphatase 48 34 - 104 U/L    ALT 9 7 - 52 U/L    AST 11 (L) 13 - 39 U/L   Magnesium   Result Value Ref Range    Magnesium 1.7 (L) 1.9 - 2.7 mg/dL   Rapid strep screen   Result Value Ref Range    Specimen Description Throat     Rapid Strep A Screen       Negative presumptive for Group A Beta Streptococcus   *UA reflex to Microscopic   Result Value Ref Range    Color Urine Yellow     Appearance Urine Clear     Glucose Urine Negative NEG^Negative mg/dL    Bilirubin Urine Negative  NEG^Negative    Ketones Urine Negative NEG^Negative mg/dL    Specific Gravity Urine 1.020 1.003 - 1.035    Blood Urine Negative NEG^Negative    pH Urine 6.0 5.0 - 7.0 pH    Protein Albumin Urine Negative NEG^Negative mg/dL    Urobilinogen Urine 0.2 0.2 - 1.0 EU/dL    Nitrite Urine Negative NEG^Negative    Leukocyte Esterase Urine Negative NEG^Negative    Source Midstream Urine        Medications   0.9% sodium chloride BOLUS (0 mLs Intravenous Stopped 6/1/18 2242)   diphenhydrAMINE (BENADRYL) injection 25 mg (25 mg Intravenous Given 6/1/18 2145)   dexamethasone (DECADRON) injection 10 mg (10 mg Intravenous Given 6/1/18 2146)   ketorolac (TORADOL) injection 30 mg (30 mg Intravenous Given 6/1/18 2145)   magnesium oxide (MAG-OX) tablet 800 mg (800 mg Oral Given 6/1/18 2159)   clindamycin (CLEOCIN) capsule 300 mg (300 mg Oral Given 6/1/18 2247)       Assessments & Plan (with Medical Decision Making)     I have reviewed the nursing notes.    I have reviewed the findings, diagnosis, plan and need for follow up with the patient.      Discharge Medication List as of 6/1/2018 11:05 PM      START taking these medications    Details   clindamycin (CLEOCIN) 300 MG capsule Take 1 capsule (300 mg) by mouth 4 times daily for 10 days, Disp-40 capsule, R-0, Local Print      magnesium oxide (MAG-OX) 400 (241.3 Mg) MG tablet Take 1 tablet (400 mg) by mouth 2 times daily, Disp-10 tablet, R-3, Local Print             Final diagnoses:   Throat pain   Fever, unspecified fever cause   Acute nonintractable headache, unspecified headache type   Dehydration   Hypomagnesemia     Febrile.  Temp 100.4 VSS.  Fever and sore throat as well as headache with fatigue and body aches x 3 days.  IV established and he was given fluids, benadryl, decadron, and toradol.  CBC shows elevated WBC's but no left shift.  CMP is unremarkable.  UA is normal.  Mg+ is decreased at 1.7 and replacement initiated. Orally.   Strep test is negative. Culture pending but  bacterial  Pharyngitis and dehydration.  He was given clindamycin in the ER.  He  reports his headache has improved and he is feeling much better.  Discussed increased fluid intake and tylenol and motrin for pain relief.  Rx for clindamycin as well as mag ox.  Follow up with his PCP if symptoms persist for further evaluation as needed.   6/1/2018   [facility]     Salo Aponte PA-C  06/02/18 104

## 2018-07-23 NOTE — PROGRESS NOTES
Patient Information     Patient Name  Kevin Koehler MRN  [id number] Sex  Male   1995      Letter by Kaci Ibarra MD at      Author:  Kaci Ibarra MD Service:  (none) Author Type:  (none)    Filed:   Encounter Date:  3/23/2017 Status:  (Other)           Kevin Love [address] [address]          2017    Dear Mr. Koehler:    This is to remind you that you are due for your 1 time ESTABLISH CARE appointment with the provider of your choice.  Your last comprehensive visit was on 2016. Additional refills of your medication require you to complete this visit.    Please call [phone] to schedule your appointment.      Thank you for choosing [facility] for your health care needs.    Sincerely,      Wilma LOVETT RN  [facility]

## 2018-09-15 ENCOUNTER — APPOINTMENT (OUTPATIENT)
Dept: GENERAL RADIOLOGY | Facility: OTHER | Age: 23
End: 2018-09-15
Attending: PHYSICIAN ASSISTANT
Payer: COMMERCIAL

## 2018-09-15 ENCOUNTER — APPOINTMENT (OUTPATIENT)
Dept: ULTRASOUND IMAGING | Facility: OTHER | Age: 23
End: 2018-09-15
Attending: PHYSICIAN ASSISTANT
Payer: COMMERCIAL

## 2018-09-15 ENCOUNTER — HOSPITAL ENCOUNTER (EMERGENCY)
Facility: OTHER | Age: 23
Discharge: HOME | End: 2018-09-15
Attending: PHYSICIAN ASSISTANT | Admitting: PHYSICIAN ASSISTANT
Payer: COMMERCIAL

## 2018-09-15 VITALS
BODY MASS INDEX: 19.97 KG/M2 | SYSTOLIC BLOOD PRESSURE: 119 MMHG | HEART RATE: 61 BPM | DIASTOLIC BLOOD PRESSURE: 70 MMHG | WEIGHT: 120 LBS | RESPIRATION RATE: 12 BRPM | OXYGEN SATURATION: 96 % | TEMPERATURE: 97.7 F

## 2018-09-15 DIAGNOSIS — N36.8: ICD-10-CM

## 2018-09-15 DIAGNOSIS — R10.11 ABDOMINAL PAIN, RIGHT UPPER QUADRANT: ICD-10-CM

## 2018-09-15 DIAGNOSIS — R31.0 GROSS HEMATURIA: ICD-10-CM

## 2018-09-15 DIAGNOSIS — R30.0 BURNING WITH URINATION: ICD-10-CM

## 2018-09-15 LAB
ALBUMIN SERPL-MCNC: 4.3 G/DL (ref 3.5–5.7)
ALBUMIN UR-MCNC: NEGATIVE MG/DL
ALP SERPL-CCNC: 39 U/L (ref 34–104)
ALT SERPL W P-5'-P-CCNC: 8 U/L (ref 7–52)
AMORPH CRY #/AREA URNS HPF: ABNORMAL /HPF
AMPHETAMINES UR QL SCN: ABNORMAL
ANION GAP SERPL CALCULATED.3IONS-SCNC: 5 MMOL/L (ref 3–14)
APPEARANCE UR: ABNORMAL
AST SERPL W P-5'-P-CCNC: 11 U/L (ref 13–39)
BACTERIA #/AREA URNS HPF: ABNORMAL /HPF
BARBITURATES UR QL: NOT DETECTED
BASOPHILS # BLD AUTO: 0 10E9/L (ref 0–0.2)
BASOPHILS NFR BLD AUTO: 0.7 %
BENZODIAZ UR QL: NOT DETECTED
BILIRUB SERPL-MCNC: 0.4 MG/DL (ref 0.3–1)
BILIRUB UR QL STRIP: NEGATIVE
BUN SERPL-MCNC: 16 MG/DL (ref 7–25)
BUPRENORPHINE UR QL: NOT DETECTED NG/ML
C TRACH DNA SPEC QL PROBE+SIG AMP: NOT DETECTED
CALCIUM SERPL-MCNC: 9.1 MG/DL (ref 8.6–10.3)
CANNABINOIDS UR QL: ABNORMAL NG/ML
CHLORIDE SERPL-SCNC: 107 MMOL/L (ref 98–107)
CO2 SERPL-SCNC: 28 MMOL/L (ref 21–31)
COCAINE UR QL: NOT DETECTED
COLOR UR AUTO: YELLOW
CREAT SERPL-MCNC: 0.89 MG/DL (ref 0.7–1.3)
D DIMER PPP DDU-MCNC: <200 NG/ML D-DU (ref 0–230)
D-METHAMPHET UR QL: NOT DETECTED NG/ML
DIFFERENTIAL METHOD BLD: ABNORMAL
EOSINOPHIL # BLD AUTO: 0.1 10E9/L (ref 0–0.7)
EOSINOPHIL NFR BLD AUTO: 2.3 %
ERYTHROCYTE [DISTWIDTH] IN BLOOD BY AUTOMATED COUNT: 12 % (ref 10–15)
GFR SERPL CREATININE-BSD FRML MDRD: >90 ML/MIN/1.7M2
GLUCOSE SERPL-MCNC: 92 MG/DL (ref 70–105)
GLUCOSE UR STRIP-MCNC: NEGATIVE MG/DL
HCT VFR BLD AUTO: 39.8 % (ref 40–53)
HGB BLD-MCNC: 14 G/DL (ref 13.3–17.7)
HGB UR QL STRIP: ABNORMAL
IMM GRANULOCYTES # BLD: 0 10E9/L (ref 0–0.4)
IMM GRANULOCYTES NFR BLD: 0.2 %
KETONES UR STRIP-MCNC: NEGATIVE MG/DL
LACTATE SERPL-SCNC: 1.6 MMOL/L (ref 0.5–2.2)
LEUKOCYTE ESTERASE UR QL STRIP: NEGATIVE
LIPASE SERPL-CCNC: 53 U/L (ref 11–82)
LYMPHOCYTES # BLD AUTO: 2.4 10E9/L (ref 0.8–5.3)
LYMPHOCYTES NFR BLD AUTO: 42.6 %
MCH RBC QN AUTO: 31.5 PG (ref 26.5–33)
MCHC RBC AUTO-ENTMCNC: 35.2 G/DL (ref 31.5–36.5)
MCV RBC AUTO: 89 FL (ref 78–100)
METHADONE UR QL SCN: NOT DETECTED
MONOCYTES # BLD AUTO: 0.5 10E9/L (ref 0–1.3)
MONOCYTES NFR BLD AUTO: 9.3 %
N GONORRHOEA DNA SPEC QL PROBE+SIG AMP: NOT DETECTED
NEUTROPHILS # BLD AUTO: 2.5 10E9/L (ref 1.6–8.3)
NEUTROPHILS NFR BLD AUTO: 44.9 %
NITRATE UR QL: NEGATIVE
OPIATES UR QL SCN: ABNORMAL
OXYCODONE UR QL: NOT DETECTED NG/ML
PCP UR QL SCN: NOT DETECTED
PH UR STRIP: 7.5 PH (ref 5–9)
PLATELET # BLD AUTO: 215 10E9/L (ref 150–450)
POTASSIUM SERPL-SCNC: 3.8 MMOL/L (ref 3.5–5.1)
PROPOXYPH UR QL: NOT DETECTED NG/ML
PROT SERPL-MCNC: 6.2 G/DL (ref 6.4–8.9)
RBC # BLD AUTO: 4.45 10E12/L (ref 4.4–5.9)
RBC #/AREA URNS AUTO: ABNORMAL /HPF
SODIUM SERPL-SCNC: 140 MMOL/L (ref 134–144)
SOURCE: ABNORMAL
SP GR UR STRIP: 1.01 (ref 1–1.03)
SPECIMEN SOURCE: NORMAL
TRICYCLICS UR QL SCN: NOT DETECTED NG/ML
TSH SERPL DL<=0.05 MIU/L-ACNC: 2.2 IU/ML (ref 0.34–5.6)
UROBILINOGEN UR STRIP-ACNC: 0.2 EU/DL (ref 0.2–1)
WBC # BLD AUTO: 5.6 10E9/L (ref 4–11)
WBC #/AREA URNS AUTO: ABNORMAL /HPF

## 2018-09-15 PROCEDURE — 99285 EMERGENCY DEPT VISIT HI MDM: CPT | Mod: 25 | Performed by: PHYSICIAN ASSISTANT

## 2018-09-15 PROCEDURE — 87491 CHLMYD TRACH DNA AMP PROBE: CPT | Performed by: PHYSICIAN ASSISTANT

## 2018-09-15 PROCEDURE — 84443 ASSAY THYROID STIM HORMONE: CPT | Performed by: PHYSICIAN ASSISTANT

## 2018-09-15 PROCEDURE — 85025 COMPLETE CBC W/AUTO DIFF WBC: CPT | Performed by: PHYSICIAN ASSISTANT

## 2018-09-15 PROCEDURE — 96361 HYDRATE IV INFUSION ADD-ON: CPT | Performed by: PHYSICIAN ASSISTANT

## 2018-09-15 PROCEDURE — 74019 RADEX ABDOMEN 2 VIEWS: CPT

## 2018-09-15 PROCEDURE — 87591 N.GONORRHOEAE DNA AMP PROB: CPT | Performed by: PHYSICIAN ASSISTANT

## 2018-09-15 PROCEDURE — 25000128 H RX IP 250 OP 636: Performed by: PHYSICIAN ASSISTANT

## 2018-09-15 PROCEDURE — 83690 ASSAY OF LIPASE: CPT | Performed by: PHYSICIAN ASSISTANT

## 2018-09-15 PROCEDURE — 81001 URINALYSIS AUTO W/SCOPE: CPT | Performed by: PHYSICIAN ASSISTANT

## 2018-09-15 PROCEDURE — 80307 DRUG TEST PRSMV CHEM ANLYZR: CPT | Performed by: PHYSICIAN ASSISTANT

## 2018-09-15 PROCEDURE — 99284 EMERGENCY DEPT VISIT MOD MDM: CPT | Mod: Z6 | Performed by: PHYSICIAN ASSISTANT

## 2018-09-15 PROCEDURE — 96374 THER/PROPH/DIAG INJ IV PUSH: CPT | Performed by: PHYSICIAN ASSISTANT

## 2018-09-15 PROCEDURE — 25000132 ZZH RX MED GY IP 250 OP 250 PS 637: Performed by: PHYSICIAN ASSISTANT

## 2018-09-15 PROCEDURE — 76770 US EXAM ABDO BACK WALL COMP: CPT

## 2018-09-15 PROCEDURE — 85379 FIBRIN DEGRADATION QUANT: CPT | Performed by: PHYSICIAN ASSISTANT

## 2018-09-15 PROCEDURE — 36415 COLL VENOUS BLD VENIPUNCTURE: CPT | Performed by: PHYSICIAN ASSISTANT

## 2018-09-15 PROCEDURE — 80053 COMPREHEN METABOLIC PANEL: CPT | Performed by: PHYSICIAN ASSISTANT

## 2018-09-15 PROCEDURE — 83605 ASSAY OF LACTIC ACID: CPT | Performed by: PHYSICIAN ASSISTANT

## 2018-09-15 RX ORDER — PHENAZOPYRIDINE HYDROCHLORIDE 100 MG/1
100 TABLET, FILM COATED ORAL ONCE
Status: COMPLETED | OUTPATIENT
Start: 2018-09-15 | End: 2018-09-15

## 2018-09-15 RX ORDER — KETOROLAC TROMETHAMINE 30 MG/ML
30 INJECTION, SOLUTION INTRAMUSCULAR; INTRAVENOUS ONCE
Status: DISCONTINUED | OUTPATIENT
Start: 2018-09-15 | End: 2018-09-15 | Stop reason: HOSPADM

## 2018-09-15 RX ORDER — PHENAZOPYRIDINE HYDROCHLORIDE 200 MG/1
200 TABLET, FILM COATED ORAL 3 TIMES DAILY PRN
Qty: 6 TABLET | Refills: 0 | Status: SHIPPED | OUTPATIENT
Start: 2018-09-15

## 2018-09-15 RX ORDER — METOCLOPRAMIDE HYDROCHLORIDE 5 MG/ML
10 INJECTION INTRAMUSCULAR; INTRAVENOUS ONCE
Status: COMPLETED | OUTPATIENT
Start: 2018-09-15 | End: 2018-09-15

## 2018-09-15 RX ORDER — SODIUM CHLORIDE 9 MG/ML
1000 INJECTION, SOLUTION INTRAVENOUS CONTINUOUS
Status: DISCONTINUED | OUTPATIENT
Start: 2018-09-15 | End: 2018-09-15 | Stop reason: HOSPADM

## 2018-09-15 RX ADMIN — PHENAZOPYRIDINE HYDROCHLORIDE 100 MG: 100 TABLET ORAL at 18:48

## 2018-09-15 RX ADMIN — SODIUM CHLORIDE 1000 ML: 900 INJECTION, SOLUTION INTRAVENOUS at 17:36

## 2018-09-15 RX ADMIN — METOCLOPRAMIDE 10 MG: 5 INJECTION, SOLUTION INTRAMUSCULAR; INTRAVENOUS at 16:20

## 2018-09-15 RX ADMIN — SODIUM CHLORIDE 1000 ML: 900 INJECTION, SOLUTION INTRAVENOUS at 16:20

## 2018-09-15 ASSESSMENT — ENCOUNTER SYMPTOMS
COLOR CHANGE: 0
ARTHRALGIAS: 0
DYSURIA: 1
NECK STIFFNESS: 0
CONFUSION: 0
DIFFICULTY URINATING: 1
APPETITE CHANGE: 0
ABDOMINAL PAIN: 1
EYE REDNESS: 0
BACK PAIN: 1
FEVER: 0
FREQUENCY: 1
SHORTNESS OF BREATH: 0
VOMITING: 0
CHILLS: 0
HEADACHES: 0
FLANK PAIN: 1
NAUSEA: 0

## 2018-09-15 ASSESSMENT — VISUAL ACUITY
OD: NOT TESTED
OS: NOT TESTED

## 2018-09-15 NOTE — PROGRESS NOTES
Handoff procedure information verbally given. Patient in bed, side rails up, call light within reach. Ultrasound complete

## 2018-09-15 NOTE — ED AVS SNAPSHOT
[facility]    [address] [place] [address]    Phone:  [phone]    Fax:  [phone]                                       Kevin Koehler   MRN: [id number]    Department:  [facility]   Date of Visit:  9/15/2018           Patient Information     Date Of Birth          1995        Your diagnoses for this visit were:     Abdominal pain, right upper quadrant     Gross hematuria     Burning with urination     Urethral colic        You were seen by Salo Aponte PA-C.      Follow-up Information     Schedule an appointment as soon as possible for a visit with System, Provider Not In.    Specialty:  Clinic    Why:  As needed, If symptoms worsen    Contact information:             Discharge References/Attachments     DYSURIA (ENGLISH)    HEMATURIA (ENGLISH)      24 Hour Appointment Hotline     To schedule an appointment at [facility], please call [phone]. If you don't have a family doctor or clinic, we will help you find one. [facility] clinics are conveniently located to serve the needs of you and your family.           Review of your medicines      START taking        Dose / Directions Last dose taken    phenazopyridine 200 MG tablet   Commonly known as:  PYRIDIUM   Dose:  200 mg   Quantity:  6 tablet        Take 1 tablet (200 mg) by mouth 3 times daily as needed for irritation   Refills:  0          Our records show that you are taking the medicines listed below. If these are incorrect, please call your family doctor or clinic.        Dose / Directions Last dose taken    miconazole 2 % powder   Commonly known as:  MICATIN; MICRO GUARD        Refills:  0        triamcinolone 0.1 % cream   Commonly known as:  KENALOG        Refills:  0                Prescriptions were sent or printed at these locations (1 Prescription)                   [facility]   [address] [place] [address]    Telephone:    "  Fax:     Hours:                  [facility] (1 of 1)         phenazopyridine (PYRIDIUM) 200 MG tablet                Procedures and tests performed during your visit     *UA reflex to Microscopic    CBC with platelets differential    Comprehensive metabolic panel    D-Dimer (HI,[facility])    Drug of Abuse Screen Urine GH    GC/Chlamydia by PCR - [facility]    Lactic acid    Lipase    Peripheral IV catheter    Thyrotropin GH    US Renal Complete    Urine Microscopic    XR Abdomen 2 Views      Orders Needing Specimen Collection     None      Pending Results     No orders found from 9/13/2018 to 9/16/2018.            Pending Culture Results     No orders found from 9/13/2018 to 9/16/2018.            Pending Results Instructions     If you had any lab results that were not finalized at the time of your Discharge, you can call the ED Lab Result RN at [phone]. You will be contacted by this team for any positive Lab results or changes in treatment. The nurses are available 7 days a week from 10A to 6:30P.  You can leave a message 24 hours per day and they will return your call.        Thank you for choosing [facility]       Thank you for choosing [facility] for your care. Our goal is always to provide you with excellent care. Hearing back from our patients is one way we can continue to improve our services. Please take a few minutes to complete the written survey that you may receive in the mail after you visit with us. Thank you!        [employer] Information     [facility] lets you send messages to your doctor, view your test results, renew your prescriptions, schedule appointments and more. To sign up, go to [email or web address] . Click on \"Log in\" on the left side of the screen, which will take you to the Welcome page. Then click on \"Sign up Now\" on the right side of the page.     You will be asked to enter the access code listed below, as well as some personal information. Please follow the directions to create your username and " password.     Your access code is: [email or web address]  Expires: 2018  6:32 PM     Your access code will  in 90 days. If you need help or a new code, please call your [facility] clinic or [phone].        Care EveryWhere ID     This is your Care EveryWhere ID. This could be used by other organizations to access your [facility] medical records  [id number]        Equal Access to Services     ROSALIO PRIDE : Hadii liu roche hadasho Soomaali, waaxda luqadaha, qaybta kaalmada adeegyada, martínez lee . So [facility] [phone].    ATENCIÓN: Si habla español, tiene a chacon disposición servicios gratuitos de asistencia lingüística. Llame al [phone].    We comply with applicable federal civil rights laws and [place] laws. We do not discriminate on the basis of race, color, national origin, age, disability, sex, sexual orientation, or gender identity.            After Visit Summary       This is your record. Keep this with you and show to your community pharmacist(s) and doctor(s) at your next visit.

## 2018-09-15 NOTE — ED TRIAGE NOTES
ED Nursing Triage Note (General)   ________________________________    Kevin Koehler is a 23 year old Male that presents to triage ambulance  With history of sudden onset of RUQ abdominal pain that started 1500, pain is radiating to mid right abdomen, he reports that it feels better with flexing abdominal muscles and pressure to painful area helps with pain.  Denies nausea or vomiting.  Had a normal bm before leaving in ambulance, which he reports was normal.  Report from EMS that with palpation pain was better and pt reports  reported by patient and EMS.  /87  Pulse 61  Temp 97.7  F (36.5  C) (Tympanic)  Resp 12  Wt 54.4 kg (120 lb)  SpO2 97%  BMI 19.97 kg/m2t  Patient appears alert  and oriented, in obvious discomfort., and cooperative behavior.  GCS Total = 15  Airway: intact  Breathing noted as Normal and nonlabored.  Lung sounds clear.  Circulation Normal with  Skin warm, moist  Action taken:  Triage to critical care immediately in [facility].    PRE HOSPITAL PRIOR LIVING SITUATION Other:  friends

## 2018-09-15 NOTE — ED PROVIDER NOTES
History   No chief complaint on file.    HPI Comments: This is a 23-year-old male is brought into the ER via EMS.  He had sudden onset of right upper quadrant abdominal pain that started 3:00, the pain seemed to radiate to the mid right abdomen.  Eyes any nausea or vomiting.  Reports pushing or pressing his abdomen actually feels better and relieves the pain.  Denies any fever or chills no diarrhea or constipation he reports he had a normal BM prior to being brought in.  Reports he has had some back pain as well.  He has some burning with urination, which he feels on the meatus of his penis.  He was given 5 mg of morphine IV via meds when ambulance prior to arrival.    The history is provided by the patient and the EMS personnel.         Problem List:    There are no active problems to display for this patient.       Past Medical History:    No past medical history on file.    Past Surgical History:    No past surgical history on file.    Family History:    No family history on file.    Social History:  Marital Status:  Single [1]  Social History   Substance Use Topics     Smoking status: Current Every Day Smoker     Packs/day: 1.00     Types: Cigarettes     Smokeless tobacco: Never Used      Comment: makes own cigarettes     Alcohol use No      Comment: Alcoholic Drinks/day: occassional        Medications:      miconazole (MICATIN; MICRO GUARD) 2 % powder   triamcinolone (KENALOG) 0.1 % cream         Review of Systems   Constitutional: Negative for appetite change, chills and fever.   HENT: Negative for congestion.    Eyes: Negative for redness.   Respiratory: Negative for shortness of breath.    Cardiovascular: Negative for chest pain.   Gastrointestinal: Positive for abdominal pain. Negative for nausea and vomiting.   Genitourinary: Positive for difficulty urinating, dysuria, flank pain, frequency and urgency.   Musculoskeletal: Positive for back pain. Negative for arthralgias and neck stiffness.   Skin:  Negative for color change.   Neurological: Negative for headaches.   Psychiatric/Behavioral: Negative for confusion.       Physical Exam   BP: 126/87 (lying on right side, taken on right arm)  Pulse: 61  Temp: 97.7  F (36.5  C)  Resp: 12  Weight: 54.4 kg (120 lb)  SpO2: 97 %  Visual Acuity-Left: not tested  Visual Acuity-Right: not tested      Physical Exam   Constitutional: He is oriented to person, place, and time. No distress.   HENT:   Head: Atraumatic.   Mouth/Throat: Oropharynx is clear and moist. No oropharyngeal exudate.   Eyes: Pupils are equal, round, and reactive to light. No scleral icterus.   Cardiovascular: Normal heart sounds and intact distal pulses.    Pulmonary/Chest: Breath sounds normal. No respiratory distress.   Lung sounds clear.  SaO2 is 97% on room air   Abdominal: Soft. Bowel sounds are normal. He exhibits no distension and no mass. There is tenderness. There is no rebound.   Minimal right upper quadrant to right sided abdominal pain.  No rebound or masses.  Bowel sounds are normal.   Musculoskeletal: He exhibits no edema or tenderness.   Neurological: He is alert and oriented to person, place, and time.   Skin: Skin is warm. No rash noted. He is not diaphoretic.       ED Course     ED Course     Procedures             Results for orders placed or performed during the hospital encounter of 09/15/18 (from the past 24 hour(s))   CBC with platelets differential   Result Value Ref Range    WBC 5.6 4.0 - 11.0 10e9/L    RBC Count 4.45 4.4 - 5.9 10e12/L    Hemoglobin 14.0 13.3 - 17.7 g/dL    Hematocrit 39.8 (L) 40.0 - 53.0 %    MCV 89 78 - 100 fl    MCH 31.5 26.5 - 33.0 pg    MCHC 35.2 31.5 - 36.5 g/dL    RDW 12.0 10.0 - 15.0 %    Platelet Count 215 150 - 450 10e9/L    Diff Method Automated Method     % Neutrophils 44.9 %    % Lymphocytes 42.6 %    % Monocytes 9.3 %    % Eosinophils 2.3 %    % Basophils 0.7 %    % Immature Granulocytes 0.2 %    Absolute Neutrophil 2.5 1.6 - 8.3 10e9/L    Absolute  Lymphocytes 2.4 0.8 - 5.3 10e9/L    Absolute Monocytes 0.5 0.0 - 1.3 10e9/L    Absolute Eosinophils 0.1 0.0 - 0.7 10e9/L    Absolute Basophils 0.0 0.0 - 0.2 10e9/L    Abs Immature Granulocytes 0.0 0 - 0.4 10e9/L   D-Dimer (HI,GH)   Result Value Ref Range    D-Dimer ng/mL <200 0 - 230 ng/ml D-DU   Comprehensive metabolic panel   Result Value Ref Range    Sodium 140 134 - 144 mmol/L    Potassium 3.8 3.5 - 5.1 mmol/L    Chloride 107 98 - 107 mmol/L    Carbon Dioxide 28 21 - 31 mmol/L    Anion Gap 5 3 - 14 mmol/L    Glucose 92 70 - 105 mg/dL    Urea Nitrogen 16 7 - 25 mg/dL    Creatinine 0.89 0.70 - 1.30 mg/dL    GFR Estimate >90 >60 mL/min/1.7m2    GFR Estimate If Black >90 >60 mL/min/1.7m2    Calcium 9.1 8.6 - 10.3 mg/dL    Bilirubin Total 0.4 0.3 - 1.0 mg/dL    Albumin 4.3 3.5 - 5.7 g/dL    Protein Total 6.2 (L) 6.4 - 8.9 g/dL    Alkaline Phosphatase 39 34 - 104 U/L    ALT 8 7 - 52 U/L    AST 11 (L) 13 - 39 U/L   Lactic acid   Result Value Ref Range    Lactic Acid 1.6 0.5 - 2.2 mmol/L   Lipase   Result Value Ref Range    Lipase 53 11 - 82 U/L   Thyrotropin GH   Result Value Ref Range    Thyrotropin 2.20 0.34 - 5.60 IU/mL   XR Abdomen 2 Views    Narrative    PROCEDURE: XR ABDOMEN 2 VW 9/15/2018 4:19 PM    HISTORY: pain;     COMPARISONS: None.    TECHNIQUE: Flat and upright    FINDINGS: The intestinal gas pattern is normal. There is no  extraluminal gas. No pathologic intra-abdominal calcifications are  seen.         Impression    IMPRESSION: Normal abdominal gas pattern    YASMIN HADLEY MD   *UA reflex to Microscopic   Result Value Ref Range    Color Urine Yellow     Appearance Urine Cloudy     Glucose Urine Negative NEG^Negative mg/dL    Bilirubin Urine Negative NEG^Negative    Ketones Urine Negative NEG^Negative mg/dL    Specific Gravity Urine 1.015 1.000 - 1.030    Blood Urine Large (A) NEG^Negative    pH Urine 7.5 5.0 - 9.0 pH    Protein Albumin Urine Negative NEG^Negative mg/dL    Urobilinogen Urine 0.2 0.2 -  1.0 EU/dL    Nitrite Urine Negative NEG^Negative    Leukocyte Esterase Urine Negative NEG^Negative    Source Midstream Urine    Urine Microscopic   Result Value Ref Range    WBC Urine 0 - 5 OTO5^0 - 5 /HPF    RBC Urine 10-25 (A) OTO2^O - 2 /HPF    Bacteria Urine Few (A) NEG^Negative /HPF    Amorphous Crystals Moderate (A) NEG^Negative /HPF   Drug of Abuse Screen Urine GH   Result Value Ref Range    Amphetamine Qual Urine Presumptive positive-Unconfirmed result (A) NDET^Not Detected    Benzodiazepine Qual Urine Not Detected NDET^Not Detected    Cocaine Qual Urine Not Detected NDET^Not Detected    Methadone Qual Urine Not Detected NDET^Not Detected    PCP Qual Urine Not Detected NDET^Not Detected    Opiates Qualitative Urine Presumptive positive-Unconfirmed result (A) NDET^Not Detected    Oxycodone Qualitative Urine Not Detected NDET^Not Detected ng/mL    Propoxyphene Qualitative Urine Not Detected NDET^Not Detected ng/mL    Tricyclic Antidepressants Qual Urine Not Detected NDET^Not Detected ng/mL    Methamphetamine Qualitative Urine Not Detected NDET^Not Detected ng/mL    Barbiturates Qual Urine Not Detected NDET^Not Detected    Cannabinoids Qualitative Urine Presumptive positive-Unconfirmed result (A) NDET^Not Detected ng/mL    Buprenorphine Qualitative Urine Not Detected NDET^Not Detected ng/mL   US Renal Complete    Narrative    PROCEDURE: US RENAL COMPLETE    HISTORY: . Hematuria    TECHNIQUE: Targeted sonographic assessment of the kidneys and bladder  was performed.    COMPARISON:  None.    MEASUREMENTS:    Right renal length: 9.8 cm  Left renal length: 10.9 cm    RENAL FINDINGS: No renal masses or hydronephrosis    BLADDER: Prevoid volume was 20 and bowels post void volume was 0. No  bladder masses were seen. The bladder jets were not visualized.      Impression    IMPRESSION:  No renal masses or hydronephrosis      YASMIN HADLEY MD       Medications   0.9% sodium chloride BOLUS (0 mLs Intravenous Stopped  9/15/18 1739)     Followed by   sodium chloride 0.9% infusion (1,000 mLs Intravenous New Bag 9/15/18 1736)   ketorolac (TORADOL) injection 30 mg (not administered)   phenazopyridine (PYRIDIUM) tablet 100 mg (not administered)   metoclopramide (REGLAN) injection 10 mg (10 mg Intravenous Given 9/15/18 1620)       Assessments & Plan (with Medical Decision Making)     I have reviewed the nursing notes.    I have reviewed the findings, diagnosis, plan and need for follow up with the patient.       New Prescriptions    PHENAZOPYRIDINE (PYRIDIUM) 200 MG TABLET    Take 1 tablet (200 mg) by mouth 3 times daily as needed for irritation       Final diagnoses:   Abdominal pain, right upper quadrant   Gross hematuria   Burning with urination   Urethral colic     Afebrile.  Vital signs stable.  Right upper quadrant abdominal pain and burning with urination brought in via EMS after receiving 5 mg of morphine.  IV established via EMS.  He was given fluids Toradol and Reglan.  CBC shows normal white blood cells with no left shift.  Hemoglobin is 14.0.  D-dimer is normal.  TSH is normal.  CMP is unremarkable.  Lipase is normal.  Lactic acid is normal.  Abdominal x-rays show no acute findings.  A normal abdominal gas pattern.  UA shows gross hematuria.  Drug tox is positive for amphetamines, opiates, and cannabinoids.  Possible cyclic vomiting syndrome but he has no nausea or emesis.  Ultrasound renal ordered this shows no renal masses or hydronephrosis.  Does have a small volume of urine.  Continues to have some urinary urgency frequency and burning with urination.  It is possible that he passed a renal stone.  Especially given his hematuria.  He was given Pyridium in the ER.  Discussed increase fluid intake and continued monitoring.  Rx for Pyridium over the next few days.  Follow-up with his primary care provider if symptoms persist or worsen for further evaluation as needed.       9/15/2018   [facility]      Salo Aponte PA-C  09/15/18 3545

## 2018-09-15 NOTE — ED AVS SNAPSHOT
[facility]    [address]    [place] [address]    Phone:  [phone]    Fax:  [phone]                                       Kevin Koehler   MRN: [id number]    Department:  [facility]   Date of Visit:  9/15/2018           After Visit Summary Signature Page     I have received my discharge instructions, and my questions have been answered. I have discussed any challenges I see with this plan with the nurse or doctor.    ..........................................................................................................................................  Patient/Patient Representative Signature      ..........................................................................................................................................  Patient Representative Print Name and Relationship to Patient    ..................................................               ................................................  Date                                   Time    ..........................................................................................................................................  Reviewed by Signature/Title    ...................................................              ..............................................  Date                                               Time          22EPIC Rev 08/18

## 2018-09-16 ENCOUNTER — APPOINTMENT (OUTPATIENT)
Dept: CT IMAGING | Facility: OTHER | Age: 23
End: 2018-09-16
Attending: EMERGENCY MEDICINE
Payer: COMMERCIAL

## 2018-09-16 ENCOUNTER — HOSPITAL ENCOUNTER (EMERGENCY)
Facility: OTHER | Age: 23
Discharge: HOME | End: 2018-09-16
Attending: EMERGENCY MEDICINE | Admitting: EMERGENCY MEDICINE
Payer: COMMERCIAL

## 2018-09-16 VITALS
TEMPERATURE: 98.2 F | HEIGHT: 65 IN | WEIGHT: 120 LBS | BODY MASS INDEX: 19.99 KG/M2 | RESPIRATION RATE: 14 BRPM | DIASTOLIC BLOOD PRESSURE: 63 MMHG | SYSTOLIC BLOOD PRESSURE: 106 MMHG | OXYGEN SATURATION: 94 %

## 2018-09-16 DIAGNOSIS — N21.0 CALCULUS OF URINARY BLADDER: Primary | ICD-10-CM

## 2018-09-16 LAB
ALBUMIN UR-MCNC: NEGATIVE MG/DL
AMPHETAMINES UR QL SCN: ABNORMAL
APPEARANCE UR: CLEAR
BACTERIA #/AREA URNS HPF: ABNORMAL /HPF
BARBITURATES UR QL: NOT DETECTED
BASOPHILS # BLD AUTO: 0 10E9/L (ref 0–0.2)
BASOPHILS NFR BLD AUTO: 0.6 %
BENZODIAZ UR QL: NOT DETECTED
BILIRUB UR QL STRIP: NEGATIVE
BUPRENORPHINE UR QL: NOT DETECTED NG/ML
CANNABINOIDS UR QL: ABNORMAL NG/ML
COCAINE UR QL: NOT DETECTED
COLOR UR AUTO: YELLOW
D-METHAMPHET UR QL: ABNORMAL NG/ML
DIFFERENTIAL METHOD BLD: NORMAL
EOSINOPHIL # BLD AUTO: 0.1 10E9/L (ref 0–0.7)
EOSINOPHIL NFR BLD AUTO: 1.9 %
ERYTHROCYTE [DISTWIDTH] IN BLOOD BY AUTOMATED COUNT: 12.2 % (ref 10–15)
GLUCOSE UR STRIP-MCNC: NEGATIVE MG/DL
HCT VFR BLD AUTO: 40.8 % (ref 40–53)
HGB BLD-MCNC: 14 G/DL (ref 13.3–17.7)
HGB UR QL STRIP: ABNORMAL
IMM GRANULOCYTES # BLD: 0 10E9/L (ref 0–0.4)
IMM GRANULOCYTES NFR BLD: 0.4 %
KETONES UR STRIP-MCNC: NEGATIVE MG/DL
LEUKOCYTE ESTERASE UR QL STRIP: NEGATIVE
LYMPHOCYTES # BLD AUTO: 2.2 10E9/L (ref 0.8–5.3)
LYMPHOCYTES NFR BLD AUTO: 31.7 %
MCH RBC QN AUTO: 30.9 PG (ref 26.5–33)
MCHC RBC AUTO-ENTMCNC: 34.3 G/DL (ref 31.5–36.5)
MCV RBC AUTO: 90 FL (ref 78–100)
METHADONE UR QL SCN: NOT DETECTED
MONOCYTES # BLD AUTO: 0.6 10E9/L (ref 0–1.3)
MONOCYTES NFR BLD AUTO: 8.2 %
MUCOUS THREADS #/AREA URNS LPF: PRESENT /LPF
NEUTROPHILS # BLD AUTO: 4 10E9/L (ref 1.6–8.3)
NEUTROPHILS NFR BLD AUTO: 57.2 %
NITRATE UR QL: POSITIVE
OPIATES UR QL SCN: ABNORMAL
OXYCODONE UR QL: NOT DETECTED NG/ML
PCP UR QL SCN: NOT DETECTED
PH UR STRIP: 5.5 PH (ref 5–9)
PLATELET # BLD AUTO: 234 10E9/L (ref 150–450)
PROPOXYPH UR QL: NOT DETECTED NG/ML
RBC # BLD AUTO: 4.53 10E12/L (ref 4.4–5.9)
RBC #/AREA URNS AUTO: ABNORMAL /HPF
SOURCE: ABNORMAL
SP GR UR STRIP: 1.02 (ref 1–1.03)
TRICYCLICS UR QL SCN: NOT DETECTED NG/ML
UROBILINOGEN UR STRIP-ACNC: 1 EU/DL (ref 0.2–1)
WBC # BLD AUTO: 6.9 10E9/L (ref 4–11)
WBC #/AREA URNS AUTO: ABNORMAL /HPF

## 2018-09-16 PROCEDURE — 99284 EMERGENCY DEPT VISIT MOD MDM: CPT | Mod: 25 | Performed by: EMERGENCY MEDICINE

## 2018-09-16 PROCEDURE — 80307 DRUG TEST PRSMV CHEM ANLYZR: CPT | Performed by: EMERGENCY MEDICINE

## 2018-09-16 PROCEDURE — 81001 URINALYSIS AUTO W/SCOPE: CPT | Performed by: EMERGENCY MEDICINE

## 2018-09-16 PROCEDURE — 74176 CT ABD & PELVIS W/O CONTRAST: CPT | Mod: TC

## 2018-09-16 PROCEDURE — 36415 COLL VENOUS BLD VENIPUNCTURE: CPT | Performed by: EMERGENCY MEDICINE

## 2018-09-16 PROCEDURE — 85025 COMPLETE CBC W/AUTO DIFF WBC: CPT | Performed by: EMERGENCY MEDICINE

## 2018-09-16 PROCEDURE — 99284 EMERGENCY DEPT VISIT MOD MDM: CPT | Mod: Z6 | Performed by: EMERGENCY MEDICINE

## 2018-09-16 ASSESSMENT — ENCOUNTER SYMPTOMS
ARTHRALGIAS: 0
SHORTNESS OF BREATH: 0
HEADACHES: 0
DYSURIA: 0
AGITATION: 0
CHILLS: 0
VOMITING: 0
CHEST TIGHTNESS: 0
ABDOMINAL PAIN: 1
FEVER: 0
NAUSEA: 0

## 2018-09-16 NOTE — ED NOTES
Patient request pain medication for home.  MD aware and states patient should take tylenol and ibuprofen, and stone has passed.  Relayed message to patient.

## 2018-09-16 NOTE — ED AVS SNAPSHOT
[facility]    [address]    [place] [address]    Phone:  [phone]    Fax:  [phone]                                       Kevin Koehler   MRN: [id number]    Department:  [facility]   Date of Visit:  9/16/2018           After Visit Summary Signature Page     I have received my discharge instructions, and my questions have been answered. I have discussed any challenges I see with this plan with the nurse or doctor.    ..........................................................................................................................................  Patient/Patient Representative Signature      ..........................................................................................................................................  Patient Representative Print Name and Relationship to Patient    ..................................................               ................................................  Date                                   Time    ..........................................................................................................................................  Reviewed by Signature/Title    ...................................................              ..............................................  Date                                               Time          22EPIC Rev 08/18

## 2018-09-16 NOTE — ED PROVIDER NOTES
Patient handed over from Dr. Lopez if she shift change at 7 AM, please see his notes  Diagnoses:  Right flank abdominal pain  Patient presented to the ED and was evaluated yesterday and discharged home.  He returns today with right frontal pain and hematuria.  Laboratory test done showed normal CBC, CMP.  CT scan of the abdomen done showed 2-3 mm stone in the bladder with fullness in the right renal pelvis but no obstruction.  Patient will be discharged home with a container for collecting the stone was passed through the urethra so that stone analysis can be done.  He will follow-up with his PCP for stone analysis.  Subsequently discharged from the ED.     Sourav Elias MD  09/16/18 0900

## 2018-09-16 NOTE — ED PROVIDER NOTES
History     Chief Complaint   Patient presents with     Flank Pain     Patient is a 23 year old male presenting with abdominal pain. The history is provided by the patient.   Abdominal Pain   Associated symptoms: no chest pain, no chills, no dysuria, no fever, no nausea, no shortness of breath and no vomiting      Kevin Koehler is a 23 year old male who comes in via ambulance with right-sided flank and abdominal pain.  He was seen for the same thing less than 24 hours ago.  This note and studies are reviewed.  He had blood in his urine but ultrasound did not show any hydronephrosis or stones.  X-ray was also unrevealing.  Labs also quite reassuring.  He was discharged and says the pain has been there on and off ever since.  It got bad again this morning and he called the ambulance again.  By the time he arrives here he says he is not having any pain.  He says that his worse with movement.  He does not feel is actually when he urinates that makes it worse but when he gets up to move around.  Still no bowel movements since the pain began.  No fevers or chills or nausea or vomiting.    Problem List:    There are no active problems to display for this patient.       Past Medical History:    No past medical history on file.    Past Surgical History:    No past surgical history on file.    Family History:    No family history on file.    Social History:  Marital Status:  Single [1]  Social History   Substance Use Topics     Smoking status: Current Every Day Smoker     Packs/day: 1.00     Types: Cigarettes     Smokeless tobacco: Never Used      Comment: is not ready to quit at this time     Alcohol use No      Comment: Alcoholic Drinks/day: occassional        Medications:      phenazopyridine (PYRIDIUM) 200 MG tablet   miconazole (MICATIN; MICRO GUARD) 2 % powder   triamcinolone (KENALOG) 0.1 % cream         Review of Systems   Constitutional: Negative for chills and fever.   HENT: Negative for congestion.    Eyes:  "Negative for visual disturbance.   Respiratory: Negative for chest tightness and shortness of breath.    Cardiovascular: Negative for chest pain.   Gastrointestinal: Positive for abdominal pain. Negative for nausea and vomiting.   Genitourinary: Negative for dysuria.   Musculoskeletal: Negative for arthralgias.   Skin: Negative for rash.   Neurological: Negative for headaches.   Psychiatric/Behavioral: Negative for agitation.       Physical Exam   BP: 101/64  Heart Rate: 48  Temp: 98.2  F (36.8  C)  Resp: 14  Height: 165.1 cm (5' 5\")  Weight: 54.4 kg (120 lb)  SpO2: 95 %      Physical Exam   Constitutional: He is oriented to person, place, and time. He appears well-developed and well-nourished. No distress.   HENT:   Head: Normocephalic and atraumatic.   Eyes: Conjunctivae are normal.   Neck: Neck supple.   Cardiovascular: Normal rate, regular rhythm and normal heart sounds.    Pulmonary/Chest: Effort normal and breath sounds normal. No respiratory distress.   Abdominal: Soft. Bowel sounds are normal. There is tenderness in the right upper quadrant.   He is tender to palpation over the right flank and the right mid abdomen.  Negative McBurney's point and less tender in the right upper quadrant than he is an inch or so below the costal margin.   Neurological: He is alert and oriented to person, place, and time.   Skin: Skin is warm and dry. He is not diaphoretic.   Psychiatric: He has a normal mood and affect. Thought content normal. His speech is delayed. He is slowed.   Nursing note and vitals reviewed.      ED Course     ED Course     Procedures              Results for orders placed or performed during the hospital encounter of 09/15/18 (from the past 24 hour(s))   CBC with platelets differential   Result Value Ref Range    WBC 5.6 4.0 - 11.0 10e9/L    RBC Count 4.45 4.4 - 5.9 10e12/L    Hemoglobin 14.0 13.3 - 17.7 g/dL    Hematocrit 39.8 (L) 40.0 - 53.0 %    MCV 89 78 - 100 fl    MCH 31.5 26.5 - 33.0 pg    MCHC " 35.2 31.5 - 36.5 g/dL    RDW 12.0 10.0 - 15.0 %    Platelet Count 215 150 - 450 10e9/L    Diff Method Automated Method     % Neutrophils 44.9 %    % Lymphocytes 42.6 %    % Monocytes 9.3 %    % Eosinophils 2.3 %    % Basophils 0.7 %    % Immature Granulocytes 0.2 %    Absolute Neutrophil 2.5 1.6 - 8.3 10e9/L    Absolute Lymphocytes 2.4 0.8 - 5.3 10e9/L    Absolute Monocytes 0.5 0.0 - 1.3 10e9/L    Absolute Eosinophils 0.1 0.0 - 0.7 10e9/L    Absolute Basophils 0.0 0.0 - 0.2 10e9/L    Abs Immature Granulocytes 0.0 0 - 0.4 10e9/L   D-Dimer (HI,GH)   Result Value Ref Range    D-Dimer ng/mL <200 0 - 230 ng/ml D-DU   Comprehensive metabolic panel   Result Value Ref Range    Sodium 140 134 - 144 mmol/L    Potassium 3.8 3.5 - 5.1 mmol/L    Chloride 107 98 - 107 mmol/L    Carbon Dioxide 28 21 - 31 mmol/L    Anion Gap 5 3 - 14 mmol/L    Glucose 92 70 - 105 mg/dL    Urea Nitrogen 16 7 - 25 mg/dL    Creatinine 0.89 0.70 - 1.30 mg/dL    GFR Estimate >90 >60 mL/min/1.7m2    GFR Estimate If Black >90 >60 mL/min/1.7m2    Calcium 9.1 8.6 - 10.3 mg/dL    Bilirubin Total 0.4 0.3 - 1.0 mg/dL    Albumin 4.3 3.5 - 5.7 g/dL    Protein Total 6.2 (L) 6.4 - 8.9 g/dL    Alkaline Phosphatase 39 34 - 104 U/L    ALT 8 7 - 52 U/L    AST 11 (L) 13 - 39 U/L   Lactic acid   Result Value Ref Range    Lactic Acid 1.6 0.5 - 2.2 mmol/L   Lipase   Result Value Ref Range    Lipase 53 11 - 82 U/L   Thyrotropin GH   Result Value Ref Range    Thyrotropin 2.20 0.34 - 5.60 IU/mL   XR Abdomen 2 Views    Narrative    PROCEDURE: XR ABDOMEN 2 VW 9/15/2018 4:19 PM    HISTORY: pain;     COMPARISONS: None.    TECHNIQUE: Flat and upright    FINDINGS: The intestinal gas pattern is normal. There is no  extraluminal gas. No pathologic intra-abdominal calcifications are  seen.         Impression    IMPRESSION: Normal abdominal gas pattern    YASMIN HADLEY MD   *UA reflex to Microscopic   Result Value Ref Range    Color Urine Yellow     Appearance Urine Cloudy      Glucose Urine Negative NEG^Negative mg/dL    Bilirubin Urine Negative NEG^Negative    Ketones Urine Negative NEG^Negative mg/dL    Specific Gravity Urine 1.015 1.000 - 1.030    Blood Urine Large (A) NEG^Negative    pH Urine 7.5 5.0 - 9.0 pH    Protein Albumin Urine Negative NEG^Negative mg/dL    Urobilinogen Urine 0.2 0.2 - 1.0 EU/dL    Nitrite Urine Negative NEG^Negative    Leukocyte Esterase Urine Negative NEG^Negative    Source Midstream Urine    Urine Microscopic   Result Value Ref Range    WBC Urine 0 - 5 OTO5^0 - 5 /HPF    RBC Urine 10-25 (A) OTO2^O - 2 /HPF    Bacteria Urine Few (A) NEG^Negative /HPF    Amorphous Crystals Moderate (A) NEG^Negative /HPF   Drug of Abuse Screen Urine GH   Result Value Ref Range    Amphetamine Qual Urine Presumptive positive-Unconfirmed result (A) NDET^Not Detected    Benzodiazepine Qual Urine Not Detected NDET^Not Detected    Cocaine Qual Urine Not Detected NDET^Not Detected    Methadone Qual Urine Not Detected NDET^Not Detected    PCP Qual Urine Not Detected NDET^Not Detected    Opiates Qualitative Urine Presumptive positive-Unconfirmed result (A) NDET^Not Detected    Oxycodone Qualitative Urine Not Detected NDET^Not Detected ng/mL    Propoxyphene Qualitative Urine Not Detected NDET^Not Detected ng/mL    Tricyclic Antidepressants Qual Urine Not Detected NDET^Not Detected ng/mL    Methamphetamine Qualitative Urine Not Detected NDET^Not Detected ng/mL    Barbiturates Qual Urine Not Detected NDET^Not Detected    Cannabinoids Qualitative Urine Presumptive positive-Unconfirmed result (A) NDET^Not Detected ng/mL    Buprenorphine Qualitative Urine Not Detected NDET^Not Detected ng/mL   GC/Chlamydia by PCR - HI,GH   Result Value Ref Range    Specimen Source Midstream Urine     Neisseria gonorrhoreae PCR Not Detected NDET^Not Detected    Chlamydia Trachomatis PCR Not Detected NDET^Not Detected   US Renal Complete    Narrative    PROCEDURE: US RENAL COMPLETE    HISTORY: .  Hematuria    TECHNIQUE: Targeted sonographic assessment of the kidneys and bladder  was performed.    COMPARISON:  None.    MEASUREMENTS:    Right renal length: 9.8 cm  Left renal length: 10.9 cm    RENAL FINDINGS: No renal masses or hydronephrosis    BLADDER: Prevoid volume was 20 and bowels post void volume was 0. No  bladder masses were seen. The bladder jets were not visualized.      Impression    IMPRESSION:  No renal masses or hydronephrosis      YASMIN HADLEY MD       Medications - No data to display    Assessments & Plan (with Medical Decision Making)     I have reviewed the nursing notes.    I have reviewed the findings, diagnosis, plan and need for follow up with the patient.  Labs and x-ray above are from yesterday's visit and are reviewed.  As he is here again, I will recheck a urine, a CBC and we will do CT scan of the abdomen as well.  Currently his pain is tolerable and is not given anything for pain.  It is currently change of shift and care of patient will be turned over to Dr. Elias.    New Prescriptions    No medications on file       Final diagnoses:   None       9/16/2018   [facility]     Broderick Lopez MD  09/16/18 0652       Broderick Lopez MD  09/16/18 0652

## 2018-09-16 NOTE — ED AVS SNAPSHOT
[facility]    [address]    [place] [address]    Phone:  [phone]    Fax:  [phone]                                       Kevin Koehler   MRN: [id number]    Department:  [facility]   Date of Visit:  9/16/2018           Patient Information     Date Of Birth          1995        Your diagnoses for this visit were:     Calculus of urinary bladder        You were seen by Broderick Lopez MD and Sourav Elias MD.      Follow-up Information     Schedule an appointment as soon as possible for a visit with No Ref-Primary, Physician.    Why:  stone analysis      Discharge References/Attachments     KIDNEY STONE W/ COLIC (ENGLISH)      24 Hour Appointment Hotline     To schedule an appointment at [facility], please call [phone]. If you don't have a family doctor or clinic, we will help you find one. [facility] are conveniently located to serve the needs of you and your family.        ED Discharge Orders     Stone analysis                    Review of your medicines      Our records show that you are taking the medicines listed below. If these are incorrect, please call your family doctor or clinic.        Dose / Directions Last dose taken    miconazole 2 % powder   Commonly known as:  MICATIN; MICRO GUARD        Refills:  0        phenazopyridine 200 MG tablet   Commonly known as:  PYRIDIUM   Dose:  200 mg   Quantity:  6 tablet        Take 1 tablet (200 mg) by mouth 3 times daily as needed for irritation   Refills:  0        triamcinolone 0.1 % cream   Commonly known as:  KENALOG        Refills:  0                Procedures and tests performed during your visit     *UA reflex to Microscopic    CBC with platelets differential    CT Abdomen Pelvis w/o Contrast    Drug of Abuse Screen Urine GH      Orders Needing Specimen Collection     None      Pending Results     No orders found from 9/14/2018 to 9/17/2018.            Pending Culture  "Results     No orders found from 2018 to 2018.            Pending Results Instructions     If you had any lab results that were not finalized at the time of your Discharge, you can call the ED Lab Result RN at [phone]. You will be contacted by this team for any positive Lab results or changes in treatment. The nurses are available 7 days a week from 10A to 6:30P.  You can leave a message 24 hours per day and they will return your call.        Thank you for choosing [facility]       Thank you for choosing [facility] for your care. Our goal is always to provide you with excellent care. Hearing back from our patients is one way we can continue to improve our services. Please take a few minutes to complete the written survey that you may receive in the mail after you visit with us. Thank you!        [facility] [employer] Information     [employer] lets you send messages to your doctor, view your test results, renew your prescriptions, schedule appointments and more. To sign up, go to [email or web address] . Click on \"Log in\" on the left side of the screen, which will take you to the Welcome page. Then click on \"Sign up Now\" on the right side of the page.     You will be asked to enter the access code listed below, as well as some personal information. Please follow the directions to create your username and password.     Your access code is: [email or web address]  Expires: 2018  6:32 PM     Your access code will  in 90 days. If you need help or a new code, please call your [facility] clinic or [phone].        Care EveryWhere ID     This is your Care EveryWhere ID. This could be used by other organizations to access your [facility] medical records  [id number]        Equal Access to Services     ROSALIO PRIDE : Eryn Wei, vijaya nix, martínez atkins. So [facility] [phone].    ATENCIÓN: Si habla español, tiene a chacon disposición servicios gratuitos " de asistencia lingüística. Kana cox [phone].    We comply with applicable federal civil rights laws and [place] laws. We do not discriminate on the basis of race, color, national origin, age, disability, sex, sexual orientation, or gender identity.            After Visit Summary       This is your record. Keep this with you and show to your community pharmacist(s) and doctor(s) at your next visit.

## 2018-09-16 NOTE — ED NOTES
Patient brought in to ER by EMS for right sided flank and abdominal pain.  Patient was seen in ER two days ago for same symptoms.  Patient denies fever, is chilled.

## 2023-08-13 ENCOUNTER — HOSPITAL ENCOUNTER (EMERGENCY)
Facility: HOSPITAL | Age: 28
Discharge: HOME | End: 2023-08-13
Attending: EMERGENCY MEDICINE
Payer: MEDICAID

## 2023-08-13 VITALS
DIASTOLIC BLOOD PRESSURE: 84 MMHG | HEIGHT: 67 IN | TEMPERATURE: 98.5 F | WEIGHT: 123 LBS | OXYGEN SATURATION: 98 % | BODY MASS INDEX: 19.3 KG/M2 | SYSTOLIC BLOOD PRESSURE: 124 MMHG | RESPIRATION RATE: 15 BRPM | HEART RATE: 68 BPM
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
## 2023-08-13 DIAGNOSIS — S86.912A MUSCLE STRAIN OF LEFT LOWER LEG, INITIAL ENCOUNTER: ICD-10-CM

## 2023-08-13 PROCEDURE — 6360000002 HC RX W HCPCS: Performed by: EMERGENCY MEDICINE

## 2023-08-13 PROCEDURE — 6370000000 HC RX 637 (ALT 250 FOR IP): Performed by: EMERGENCY MEDICINE

## 2023-08-13 PROCEDURE — 99284 EMERGENCY DEPT VISIT MOD MDM: CPT

## 2023-08-13 PROCEDURE — 96372 THER/PROPH/DIAG INJ SC/IM: CPT

## 2023-08-13 RX ORDER — METHOCARBAMOL 500 MG/1
1000 TABLET, FILM COATED ORAL ONCE
Status: COMPLETED | OUTPATIENT
Start: 2023-08-13 | End: 2023-08-13

## 2023-08-13 RX ORDER — NAPROXEN 500 MG/1
500 TABLET ORAL 2 TIMES DAILY
Qty: 60 TABLET | Refills: 0 | Status: SHIPPED | OUTPATIENT
Start: 2023-08-13

## 2023-08-13 RX ORDER — KETOROLAC TROMETHAMINE 30 MG/ML
30 INJECTION, SOLUTION INTRAMUSCULAR; INTRAVENOUS ONCE
Status: COMPLETED | OUTPATIENT
Start: 2023-08-13 | End: 2023-08-13

## 2023-08-13 RX ORDER — METHOCARBAMOL 750 MG/1
750 TABLET, FILM COATED ORAL 4 TIMES DAILY
Qty: 40 TABLET | Refills: 0 | Status: SHIPPED | OUTPATIENT
Start: 2023-08-13 | End: 2023-08-23

## 2023-08-13 RX ADMIN — METHOCARBAMOL 1000 MG: 500 TABLET ORAL at 07:51

## 2023-08-13 RX ADMIN — KETOROLAC TROMETHAMINE 30 MG: 30 INJECTION, SOLUTION INTRAMUSCULAR; INTRAVENOUS at 07:50

## 2023-08-13 ASSESSMENT — PAIN DESCRIPTION - ORIENTATION
ORIENTATION: LEFT

## 2023-08-13 ASSESSMENT — PAIN DESCRIPTION - LOCATION
LOCATION: KNEE;LEG
LOCATION: KNEE;LEG
LOCATION: LEG

## 2023-08-13 ASSESSMENT — PAIN SCALES - GENERAL
PAINLEVEL_OUTOF10: 8
PAINLEVEL_OUTOF10: 4
PAINLEVEL_OUTOF10: 7

## 2023-08-13 ASSESSMENT — PAIN DESCRIPTION - DESCRIPTORS: DESCRIPTORS: ACHING;DULL;TENDER

## 2023-08-13 ASSESSMENT — PAIN - FUNCTIONAL ASSESSMENT: PAIN_FUNCTIONAL_ASSESSMENT: 0-10

## 2023-08-13 NOTE — ED NOTES
Patient medicated for pain as ordered. Patient instructed and successfully return demonstrated crutch walking.        Bernice Do RN  08/13/23 0320
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
## 2023-08-13 NOTE — ED NOTES
Discharge and medication instructions reviewed with the patient.   Patient discharged with the aid of camille Smyth RN  08/13/23 3662
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
## (undated) RX ORDER — SODIUM CHLORIDE 9 MG/ML
INJECTION, SOLUTION INTRAVENOUS
Status: DISPENSED
Start: 2018-06-01

## (undated) RX ORDER — KETOROLAC TROMETHAMINE 30 MG/ML
INJECTION, SOLUTION INTRAMUSCULAR; INTRAVENOUS
Status: DISPENSED
Start: 2018-06-01

## (undated) RX ORDER — PHENAZOPYRIDINE HYDROCHLORIDE 100 MG/1
TABLET, FILM COATED ORAL
Status: DISPENSED
Start: 2018-09-15

## (undated) RX ORDER — DIPHENHYDRAMINE HYDROCHLORIDE 50 MG/ML
INJECTION INTRAMUSCULAR; INTRAVENOUS
Status: DISPENSED
Start: 2018-06-01

## (undated) RX ORDER — DEXAMETHASONE SODIUM PHOSPHATE 4 MG/ML
INJECTION, SOLUTION INTRA-ARTICULAR; INTRALESIONAL; INTRAMUSCULAR; INTRAVENOUS; SOFT TISSUE
Status: DISPENSED
Start: 2018-06-01

## (undated) RX ORDER — METOCLOPRAMIDE HYDROCHLORIDE 5 MG/ML
INJECTION INTRAMUSCULAR; INTRAVENOUS
Status: DISPENSED
Start: 2018-09-15

## (undated) RX ORDER — CLINDAMYCIN HCL 150 MG
CAPSULE ORAL
Status: DISPENSED
Start: 2018-06-01

## (undated) RX ORDER — SODIUM CHLORIDE 9 MG/ML
INJECTION, SOLUTION INTRAVENOUS
Status: DISPENSED
Start: 2018-09-15

## (undated) RX ORDER — MAGNESIUM OXIDE 400 MG/1
TABLET ORAL
Status: DISPENSED
Start: 2018-06-01